# Patient Record
Sex: FEMALE | Race: WHITE | NOT HISPANIC OR LATINO | Employment: OTHER | URBAN - METROPOLITAN AREA
[De-identification: names, ages, dates, MRNs, and addresses within clinical notes are randomized per-mention and may not be internally consistent; named-entity substitution may affect disease eponyms.]

---

## 2017-06-14 ENCOUNTER — HOSPITAL ENCOUNTER (INPATIENT)
Facility: CLINIC | Age: 62
LOS: 3 days | Discharge: HOME | DRG: 194 | End: 2017-06-17
Attending: EMERGENCY MEDICINE | Admitting: INTERNAL MEDICINE
Payer: COMMERCIAL

## 2017-06-14 ENCOUNTER — APPOINTMENT (OUTPATIENT)
Dept: CT IMAGING | Facility: CLINIC | Age: 62
DRG: 194 | End: 2017-06-14
Attending: EMERGENCY MEDICINE
Payer: COMMERCIAL

## 2017-06-14 DIAGNOSIS — E87.6 HYPOKALEMIA: ICD-10-CM

## 2017-06-14 DIAGNOSIS — R91.8 LEFT PULMONARY INFILTRATE ON CXR: ICD-10-CM

## 2017-06-14 DIAGNOSIS — I24.9 ACS (ACUTE CORONARY SYNDROME) (H): ICD-10-CM

## 2017-06-14 DIAGNOSIS — R79.89 ELEVATED TROPONIN: Primary | ICD-10-CM

## 2017-06-14 LAB
ANION GAP SERPL CALCULATED.3IONS-SCNC: 8 MMOL/L (ref 3–14)
BASOPHILS # BLD AUTO: 0 10E9/L (ref 0–0.2)
BASOPHILS NFR BLD AUTO: 0.1 %
BUN SERPL-MCNC: 14 MG/DL (ref 7–30)
CALCIUM SERPL-MCNC: 8.8 MG/DL (ref 8.5–10.1)
CHLORIDE SERPL-SCNC: 101 MMOL/L (ref 94–109)
CO2 SERPL-SCNC: 21 MMOL/L (ref 20–32)
CREAT SERPL-MCNC: 0.73 MG/DL (ref 0.52–1.04)
DIFFERENTIAL METHOD BLD: ABNORMAL
EOSINOPHIL # BLD AUTO: 0 10E9/L (ref 0–0.7)
EOSINOPHIL NFR BLD AUTO: 0.6 %
ERYTHROCYTE [DISTWIDTH] IN BLOOD BY AUTOMATED COUNT: 13.6 % (ref 10–15)
GFR SERPL CREATININE-BSD FRML MDRD: 81 ML/MIN/1.7M2
GLUCOSE SERPL-MCNC: 111 MG/DL (ref 70–99)
HCT VFR BLD AUTO: 32.7 % (ref 35–47)
HGB BLD-MCNC: 10.8 G/DL (ref 11.7–15.7)
IMM GRANULOCYTES # BLD: 0 10E9/L (ref 0–0.4)
IMM GRANULOCYTES NFR BLD: 0.4 %
LYMPHOCYTES # BLD AUTO: 0.7 10E9/L (ref 0.8–5.3)
LYMPHOCYTES NFR BLD AUTO: 10.4 %
MCH RBC QN AUTO: 26.2 PG (ref 26.5–33)
MCHC RBC AUTO-ENTMCNC: 33 G/DL (ref 31.5–36.5)
MCV RBC AUTO: 79 FL (ref 78–100)
MONOCYTES # BLD AUTO: 0.7 10E9/L (ref 0–1.3)
MONOCYTES NFR BLD AUTO: 10.4 %
NEUTROPHILS # BLD AUTO: 5.2 10E9/L (ref 1.6–8.3)
NEUTROPHILS NFR BLD AUTO: 78.1 %
NRBC # BLD AUTO: 0 10*3/UL
NRBC BLD AUTO-RTO: 0 /100
NT-PROBNP SERPL-MCNC: 2010 PG/ML (ref 0–900)
PLATELET # BLD AUTO: 156 10E9/L (ref 150–450)
POTASSIUM SERPL-SCNC: 3 MMOL/L (ref 3.4–5.3)
POTASSIUM SERPL-SCNC: 3.3 MMOL/L (ref 3.4–5.3)
RBC # BLD AUTO: 4.12 10E12/L (ref 3.8–5.2)
SODIUM SERPL-SCNC: 130 MMOL/L (ref 133–144)
TROPONIN I BLD-MCNC: 0.33 UG/L (ref 0–0.1)
TROPONIN I SERPL-MCNC: 0.23 UG/L (ref 0–0.04)
TROPONIN I SERPL-MCNC: 0.3 UG/L (ref 0–0.04)
WBC # BLD AUTO: 6.7 10E9/L (ref 4–11)

## 2017-06-14 PROCEDURE — 84145 PROCALCITONIN (PCT): CPT | Performed by: EMERGENCY MEDICINE

## 2017-06-14 PROCEDURE — 80048 BASIC METABOLIC PNL TOTAL CA: CPT | Performed by: EMERGENCY MEDICINE

## 2017-06-14 PROCEDURE — 96365 THER/PROPH/DIAG IV INF INIT: CPT

## 2017-06-14 PROCEDURE — 84132 ASSAY OF SERUM POTASSIUM: CPT | Performed by: PHYSICIAN ASSISTANT

## 2017-06-14 PROCEDURE — 84484 ASSAY OF TROPONIN QUANT: CPT | Performed by: PHYSICIAN ASSISTANT

## 2017-06-14 PROCEDURE — 83880 ASSAY OF NATRIURETIC PEPTIDE: CPT | Performed by: EMERGENCY MEDICINE

## 2017-06-14 PROCEDURE — 36415 COLL VENOUS BLD VENIPUNCTURE: CPT | Performed by: PHYSICIAN ASSISTANT

## 2017-06-14 PROCEDURE — 25000128 H RX IP 250 OP 636: Performed by: PHYSICIAN ASSISTANT

## 2017-06-14 PROCEDURE — 84484 ASSAY OF TROPONIN QUANT: CPT

## 2017-06-14 PROCEDURE — 93005 ELECTROCARDIOGRAM TRACING: CPT

## 2017-06-14 PROCEDURE — 99223 1ST HOSP IP/OBS HIGH 75: CPT | Mod: AI | Performed by: INTERNAL MEDICINE

## 2017-06-14 PROCEDURE — 71260 CT THORAX DX C+: CPT

## 2017-06-14 PROCEDURE — 12000007 ZZH R&B INTERMEDIATE

## 2017-06-14 PROCEDURE — 85025 COMPLETE CBC W/AUTO DIFF WBC: CPT | Performed by: EMERGENCY MEDICINE

## 2017-06-14 PROCEDURE — 25000132 ZZH RX MED GY IP 250 OP 250 PS 637: Performed by: EMERGENCY MEDICINE

## 2017-06-14 PROCEDURE — 96376 TX/PRO/DX INJ SAME DRUG ADON: CPT

## 2017-06-14 PROCEDURE — 99285 EMERGENCY DEPT VISIT HI MDM: CPT | Mod: 25

## 2017-06-14 PROCEDURE — 25000128 H RX IP 250 OP 636: Performed by: EMERGENCY MEDICINE

## 2017-06-14 PROCEDURE — 93005 ELECTROCARDIOGRAM TRACING: CPT | Mod: 76

## 2017-06-14 PROCEDURE — 25000132 ZZH RX MED GY IP 250 OP 250 PS 637: Performed by: PHYSICIAN ASSISTANT

## 2017-06-14 PROCEDURE — 84484 ASSAY OF TROPONIN QUANT: CPT | Performed by: EMERGENCY MEDICINE

## 2017-06-14 RX ORDER — ATORVASTATIN CALCIUM 40 MG/1
40 TABLET, FILM COATED ORAL
Status: DISCONTINUED | OUTPATIENT
Start: 2017-06-14 | End: 2017-06-15

## 2017-06-14 RX ORDER — ACETAMINOPHEN 650 MG/1
650 SUPPOSITORY RECTAL EVERY 4 HOURS PRN
Status: DISCONTINUED | OUTPATIENT
Start: 2017-06-14 | End: 2017-06-17 | Stop reason: HOSPADM

## 2017-06-14 RX ORDER — POTASSIUM CHLORIDE 1500 MG/1
20-40 TABLET, EXTENDED RELEASE ORAL
Status: DISCONTINUED | OUTPATIENT
Start: 2017-06-14 | End: 2017-06-17 | Stop reason: HOSPADM

## 2017-06-14 RX ORDER — NALOXONE HYDROCHLORIDE 0.4 MG/ML
.1-.4 INJECTION, SOLUTION INTRAMUSCULAR; INTRAVENOUS; SUBCUTANEOUS
Status: DISCONTINUED | OUTPATIENT
Start: 2017-06-14 | End: 2017-06-17 | Stop reason: HOSPADM

## 2017-06-14 RX ORDER — ACETAMINOPHEN 325 MG/1
650 TABLET ORAL EVERY 4 HOURS PRN
Status: DISCONTINUED | OUTPATIENT
Start: 2017-06-14 | End: 2017-06-14

## 2017-06-14 RX ORDER — IPRATROPIUM BROMIDE AND ALBUTEROL SULFATE 2.5; .5 MG/3ML; MG/3ML
3 SOLUTION RESPIRATORY (INHALATION) 4 TIMES DAILY PRN
Status: DISCONTINUED | OUTPATIENT
Start: 2017-06-14 | End: 2017-06-17 | Stop reason: HOSPADM

## 2017-06-14 RX ORDER — LIDOCAINE 40 MG/G
CREAM TOPICAL
Status: DISCONTINUED | OUTPATIENT
Start: 2017-06-14 | End: 2017-06-14

## 2017-06-14 RX ORDER — ASPIRIN 325 MG
325 TABLET ORAL DAILY
Status: DISCONTINUED | OUTPATIENT
Start: 2017-06-15 | End: 2017-06-17 | Stop reason: HOSPADM

## 2017-06-14 RX ORDER — FLUTICASONE PROPIONATE 50 MCG
2 SPRAY, SUSPENSION (ML) NASAL DAILY
Status: ON HOLD | COMMUNITY
End: 2022-10-03

## 2017-06-14 RX ORDER — ALUMINA, MAGNESIA, AND SIMETHICONE 2400; 2400; 240 MG/30ML; MG/30ML; MG/30ML
15-30 SUSPENSION ORAL EVERY 4 HOURS PRN
Status: DISCONTINUED | OUTPATIENT
Start: 2017-06-14 | End: 2017-06-17 | Stop reason: HOSPADM

## 2017-06-14 RX ORDER — AMPICILLIN AND SULBACTAM 2; 1 G/1; G/1
3 INJECTION, POWDER, FOR SOLUTION INTRAMUSCULAR; INTRAVENOUS EVERY 6 HOURS
Status: DISCONTINUED | OUTPATIENT
Start: 2017-06-14 | End: 2017-06-17

## 2017-06-14 RX ORDER — POTASSIUM CHLORIDE 7.45 MG/ML
10 INJECTION INTRAVENOUS
Status: DISCONTINUED | OUTPATIENT
Start: 2017-06-14 | End: 2017-06-17 | Stop reason: HOSPADM

## 2017-06-14 RX ORDER — ASPIRIN 81 MG/1
324 TABLET, CHEWABLE ORAL ONCE
Status: COMPLETED | OUTPATIENT
Start: 2017-06-14 | End: 2017-06-14

## 2017-06-14 RX ORDER — LIDOCAINE 40 MG/G
CREAM TOPICAL
Status: DISCONTINUED | OUTPATIENT
Start: 2017-06-14 | End: 2017-06-17 | Stop reason: HOSPADM

## 2017-06-14 RX ORDER — POTASSIUM CHLORIDE 1.5 G/1.58G
20-40 POWDER, FOR SOLUTION ORAL
Status: DISCONTINUED | OUTPATIENT
Start: 2017-06-14 | End: 2017-06-17 | Stop reason: HOSPADM

## 2017-06-14 RX ORDER — PROCHLORPERAZINE 25 MG
25 SUPPOSITORY, RECTAL RECTAL EVERY 12 HOURS PRN
Status: DISCONTINUED | OUTPATIENT
Start: 2017-06-14 | End: 2017-06-17 | Stop reason: HOSPADM

## 2017-06-14 RX ORDER — SODIUM CHLORIDE 9 MG/ML
INJECTION, SOLUTION INTRAVENOUS CONTINUOUS
Status: ACTIVE | OUTPATIENT
Start: 2017-06-14 | End: 2017-06-15

## 2017-06-14 RX ORDER — POTASSIUM CHLORIDE 1500 MG/1
40 TABLET, EXTENDED RELEASE ORAL ONCE
Status: COMPLETED | OUTPATIENT
Start: 2017-06-14 | End: 2017-06-14

## 2017-06-14 RX ORDER — LOSARTAN POTASSIUM 25 MG/1
25 TABLET ORAL EVERY MORNING
Status: DISCONTINUED | OUTPATIENT
Start: 2017-06-15 | End: 2017-06-17 | Stop reason: HOSPADM

## 2017-06-14 RX ORDER — ONDANSETRON 4 MG/1
4 TABLET, ORALLY DISINTEGRATING ORAL EVERY 6 HOURS PRN
Status: DISCONTINUED | OUTPATIENT
Start: 2017-06-14 | End: 2017-06-17 | Stop reason: HOSPADM

## 2017-06-14 RX ORDER — ALPRAZOLAM 0.5 MG/1
0.5 TABLET, EXTENDED RELEASE ORAL 3 TIMES DAILY PRN
Status: DISCONTINUED | OUTPATIENT
Start: 2017-06-14 | End: 2017-06-17 | Stop reason: HOSPADM

## 2017-06-14 RX ORDER — DIPHENHYDRAMINE HCL 25 MG
50 CAPSULE ORAL EVERY 6 HOURS PRN
Status: DISCONTINUED | OUTPATIENT
Start: 2017-06-14 | End: 2017-06-17 | Stop reason: HOSPADM

## 2017-06-14 RX ORDER — ACETAMINOPHEN 325 MG/1
650 TABLET ORAL EVERY 4 HOURS PRN
Status: DISCONTINUED | OUTPATIENT
Start: 2017-06-14 | End: 2017-06-17 | Stop reason: HOSPADM

## 2017-06-14 RX ORDER — PROCHLORPERAZINE MALEATE 5 MG
5-10 TABLET ORAL EVERY 6 HOURS PRN
Status: DISCONTINUED | OUTPATIENT
Start: 2017-06-14 | End: 2017-06-17 | Stop reason: HOSPADM

## 2017-06-14 RX ORDER — POTASSIUM CHLORIDE 29.8 MG/ML
20 INJECTION INTRAVENOUS
Status: DISCONTINUED | OUTPATIENT
Start: 2017-06-14 | End: 2017-06-17 | Stop reason: HOSPADM

## 2017-06-14 RX ORDER — CEFTRIAXONE 2 G/1
2 INJECTION, POWDER, FOR SOLUTION INTRAMUSCULAR; INTRAVENOUS EVERY 24 HOURS
Status: DISCONTINUED | OUTPATIENT
Start: 2017-06-14 | End: 2017-06-14

## 2017-06-14 RX ORDER — MULTIPLE VITAMINS W/ MINERALS TAB 9MG-400MCG
1 TAB ORAL DAILY
COMMUNITY

## 2017-06-14 RX ORDER — NITROGLYCERIN 0.4 MG/1
0.4 TABLET SUBLINGUAL EVERY 5 MIN PRN
Status: DISCONTINUED | OUTPATIENT
Start: 2017-06-14 | End: 2017-06-14

## 2017-06-14 RX ORDER — IOPAMIDOL 755 MG/ML
500 INJECTION, SOLUTION INTRAVASCULAR ONCE
Status: COMPLETED | OUTPATIENT
Start: 2017-06-14 | End: 2017-06-14

## 2017-06-14 RX ORDER — OMEGA-3 FATTY ACIDS/FISH OIL 300-1000MG
1000 CAPSULE ORAL EVERY MORNING
Status: ON HOLD | COMMUNITY
End: 2022-10-03

## 2017-06-14 RX ORDER — AZITHROMYCIN 250 MG/1
250 TABLET, FILM COATED ORAL EVERY 24 HOURS
Status: DISCONTINUED | OUTPATIENT
Start: 2017-06-15 | End: 2017-06-17 | Stop reason: HOSPADM

## 2017-06-14 RX ORDER — POTASSIUM CL/LIDO/0.9 % NACL 10MEQ/0.1L
10 INTRAVENOUS SOLUTION, PIGGYBACK (ML) INTRAVENOUS
Status: DISCONTINUED | OUTPATIENT
Start: 2017-06-14 | End: 2017-06-17 | Stop reason: HOSPADM

## 2017-06-14 RX ORDER — SERTRALINE HYDROCHLORIDE 100 MG/1
150 TABLET, FILM COATED ORAL EVERY MORNING
COMMUNITY

## 2017-06-14 RX ORDER — BISACODYL 10 MG
10 SUPPOSITORY, RECTAL RECTAL DAILY PRN
Status: DISCONTINUED | OUTPATIENT
Start: 2017-06-14 | End: 2017-06-17 | Stop reason: HOSPADM

## 2017-06-14 RX ORDER — ONDANSETRON 2 MG/ML
4 INJECTION INTRAMUSCULAR; INTRAVENOUS EVERY 6 HOURS PRN
Status: DISCONTINUED | OUTPATIENT
Start: 2017-06-14 | End: 2017-06-17 | Stop reason: HOSPADM

## 2017-06-14 RX ADMIN — SODIUM CHLORIDE 80 ML: 9 INJECTION, SOLUTION INTRAVENOUS at 18:21

## 2017-06-14 RX ADMIN — ASPIRIN 81 MG 324 MG: 81 TABLET ORAL at 18:05

## 2017-06-14 RX ADMIN — HEPARIN SODIUM 12 UNITS/KG/HR: 10000 INJECTION, SOLUTION INTRAVENOUS at 19:47

## 2017-06-14 RX ADMIN — POTASSIUM CHLORIDE 40 MEQ: 1500 TABLET, EXTENDED RELEASE ORAL at 18:52

## 2017-06-14 RX ADMIN — SODIUM CHLORIDE: 9 INJECTION, SOLUTION INTRAVENOUS at 22:14

## 2017-06-14 RX ADMIN — IOPAMIDOL 64 ML: 755 INJECTION, SOLUTION INTRAVENOUS at 18:21

## 2017-06-14 RX ADMIN — AZITHROMYCIN MONOHYDRATE 500 MG: 500 INJECTION, POWDER, LYOPHILIZED, FOR SOLUTION INTRAVENOUS at 22:16

## 2017-06-14 RX ADMIN — NITROGLYCERIN 0.4 MG: 0.4 TABLET SUBLINGUAL at 19:42

## 2017-06-14 RX ADMIN — METOPROLOL TARTRATE 12.5 MG: 25 TABLET, FILM COATED ORAL at 22:28

## 2017-06-14 RX ADMIN — Medication 4000 UNITS: at 19:47

## 2017-06-14 RX ADMIN — ACETAMINOPHEN 650 MG: 325 TABLET, FILM COATED ORAL at 22:27

## 2017-06-14 ASSESSMENT — ENCOUNTER SYMPTOMS
FATIGUE: 1
NAUSEA: 0
BACK PAIN: 0
VOMITING: 0
FEVER: 1
SHORTNESS OF BREATH: 1
COUGH: 1
CHILLS: 1

## 2017-06-14 NOTE — IP AVS SNAPSHOT
[place] [address] [facility]    [address]    [facility] [address]    Phone:  [phone]    Fax:  [phone]                                       After Visit Summary   6/14/2017    Zulema Carrillo    MRN: [id number]           After Visit Summary Signature Page     I have received my discharge instructions, and my questions have been answered. I have discussed any challenges I see with this plan with the nurse or doctor.    ..........................................................................................................................................  Patient/Patient Representative Signature      ..........................................................................................................................................  Patient Representative Print Name and Relationship to Patient    ..................................................               ................................................  Date                                            Time    ..........................................................................................................................................  Reviewed by Signature/Title    ...................................................              ..............................................  Date                                                            Time

## 2017-06-14 NOTE — ED PROVIDER NOTES
History     Chief Complaint:  Chest Pain    HPI:  Zulema Carrillo is a 61 year old  female with a history of hyperlipidemia, mitral valve disorder, amongst others, who presents with chest pain. The patient reports that she has been experiencing increasing fatigue, slight cough, chills, and subjective fever for the past few days. More concerning to her is her intermittent, achy, chest pain lasting for less than five minutes at a time with exertional shortness of breath, both of which are atypical for her. She also reports more shortness of breath than typical with exertion over the past month. Today, she first tried to see her primary doctor who was busy this afternoon and referred her to urgent care. At [facility] urgent care, she had laboratory work and an X-Ray performed. In addition, she received 650 mg Tylenol around 1530. Her chest X-Ray revealed a pneumonia in the left lung base. After she returned home, she received a call from her urgent care physician that she needed to visit the ED for an abnormal laboratory value. Regarding her cardiac history, she is normally healthy and last had a normal cardiac stress test two years ago. She endorses recent sick contacts as she is a , but otherwise denies calf pain or swelling, recent travel, nausea, vomiting, or back pain.     Chest X-Ray performed in Clinic 6/14/2017:  FINDINGS:  Patchy consolidation in the left lower lobe favors pneumonia. Lungs otherwise clear. Heart size normal.  As read by Radiology.     Laboratory Results from Clinic 6/14/2017:  D-Dimer: 0.74 (H)   Troponin I: 0.47 (H)     CARDIAC RISK FACTORS:  Sex:    Female  Tobacco:   No  Hypertension:   Yes  Hyperlipidemia:  Yes  Diabetes:   No  Family History:  Yes    PE/DVT RISK FACTORS:  Sex:    Female  Hormones:   No  Tobacco:   No  Cancer:   No  Travel:   No  Surgery:   No  Other immobilization: No  Personal history:  No  Family history:  No  "    Allergies:  Cephalexin  Ciprofloxacin  Glucosamine  Hydromorphone  Ibuprofen  Iodine  Lisinopril  Morphine      Medications:    Azithromycin  Simvastatin  Zoloft  Losartan  Flonase  Xanax  Fish oil  Cholecalciferol  Multivitamin  Imitrex     Past Medical History:    Hyperlipidemia  Depression  Migraines  Mitral valve disorder  Osteoarthritis  Sleep apnea  Hypertension  Depression    Past Surgical History:    Hysterectomy  Salpingo-oophorectomy  Mastectomy  Menisectomy  Breast surgery    Family History:    Cancer- Father, Sister x2  Hypertension- Father, Sister  Heart Surgery- Mother  Hyperlipidemia- Mother, Sister    Social History:  Smoking status: Never Smoker  Alcohol use: No   Marital Status:     Presents with  at bedside.      Review of Systems   Constitutional: Positive for chills, fatigue and fever.   Respiratory: Positive for cough and shortness of breath.    Cardiovascular: Positive for chest pain.   Gastrointestinal: Negative for nausea and vomiting.   Musculoskeletal: Negative for back pain.   All other systems reviewed and are negative.      Physical Exam     Patient Vitals for the past 24 hrs:   BP Temp Temp src Heart Rate Resp SpO2 Height Weight   06/14/17 1945 126/80 - - 82 - 97 % - -   06/14/17 1930 129/79 - - 81 - 97 % - -   06/14/17 1915 (!) 122/91 - - 82 - 97 % - -   06/14/17 1912 - - - - - - 1.702 m (5' 7\") 76.2 kg (168 lb)   06/14/17 1900 (!) 124/94 - - 81 - - - -   06/14/17 1800 129/80 - - 83 - 96 % - -   06/14/17 1745 119/84 - - 85 - 97 % - -   06/14/17 1730 119/78 - - 83 - 96 % - -   06/14/17 1715 122/78 - - 84 - 96 % - -   06/14/17 1700 133/87 - - - - 97 % - -   06/14/17 1657 133/86 98.5  F (36.9  C) Oral 87 16 98 % - -      Physical Exam:  General: Adult female, sitting upright  Eyes: PERRL, Conjunctive within normal limits  ENT: Moist mucous membranes, oropharynx clear.   Neck: No palpable thyroidmegaly   CV: Normal S1S2, no murmur, rub or gallop. Regular rate and rhythm. " No calf asymmetry or tenderness to palpation.   Resp: Crackle in left lung base. No wheezes, rales or rhonchi. Normal respiratory effort.  GI: Abdomen is soft, nontender and nondistended. No palpable masses. No rebound or guarding.  MSK: No edema. Nontender. Normal active range of motion.  Skin: Warm and dry. No rashes or lesions or ecchymoses on visible skin.  Neuro: Alert and oriented. Responds appropriately to all questions and commands. No focal findings appreciated. Normal muscle tone.  Psych: Normal mood and affect. Pleasant.    Emergency Department Course     ECG (16:59:48):  Rate 85 bpm. MN interval 164. QRS duration 84. QT/QTc 338/402. P-R-T axes 48-51-55. Normal sinus rhythm. Nonspecific ST and T wave abnormality. Abnormal ECG. Interpreted at 1700 by Shanice Avila MD.     ECG (18:44:17):  Rate 85 bpm. MN interval 166. QRS duration 84. QT/QTc 350/416. P-R-T axes 52-52-53. Normal sinus rhythm. Cannot rule out anterior infarct, age undetermined. Abnormal ECG. Interpreted at 1846 by Shanice Avila MD.     Imaging:  Radiographic findings were communicated with the patient and family who voiced understanding of the findings.    Chest CT, IV Contrast Only:  IMPRESSION:  1. No CT evidence of pulmonary embolism.  2. Left lower lobe alveolar opacity. While nonspecific, this is  suspicious for pneumonia.  As read by Radiology.    Laboratory:  BNP: 2010 (H)   Troponin I: 0.302 (H)   Basic Metabolic Panel: Sodium 130 (L), Potassium 3.0 (L), Glucose 111 (H), o/w WNL (Creatinine 0.73)   CBC: HGB 10.8 (L), HCT 32.7 (L), MCH 26.2 (L), Absolute Lymphocytes 0.7 (L), o/w WNL (WBC 6.7, )    Troponin POCT: 0.33 (H)     Interventions:  1805- Aspirin 324 mg PO  1852- Potassium Chloride 40 mEq PO  1942- Nitroglycerin 0.4 mg Sublingual  1947- Heparin Loading Dose 4,000 units IV  1947- Heparin Infusion 25,000 units (12 units/kg/hr) IV    Emergency Department Course:  An ECG was obtained.     Past medical  records, nursing notes, and vitals reviewed.  1732: I performed an exam of the patient and obtained history, as documented above.     IV inserted and blood drawn.     1755: I rechecked the patient. Explained plan to the patient.     The above interventions were administered. Patient denies any chest pain currently.    The patient was sent for a chest CT while in the emergency department, findings above.     Another ECG was obtained.    1933: I discussed the case with Dr. Gurrola of cardiology regarding the patient.     Findings and plan explained to the Patient and spouse who consents to admission. Patient notes slight pain under left breast.     Patient reassessed,  denies any current pain after single sublingual nitroglycerin.    2000: Discussed the patient with Lyndsey Lentz PA-C, who will admit the patient to a telemetry bed under the care of Dr. Win for further monitoring, evaluation, and treatment.      Impression & Plan      Medical Decision Making:  Zulema Carrillo is a 61 year old female with a history of hyperlipidemia and mitral valve disorder who presents to the emergency department for intermittent chest pain, exertional dyspnea. She is somewhat of a poor historian, but it sounds like her exertional dyspnea has been ongoing for the past month, whereas her chest pain has only lasted for a couple days. Her chest pain lasts less than 5 minutes at a time. Here, she has been experiencing intermittent chest pain, relieved with Nitroglycerin. She arrived with an elevated troponin of 0.47, but there were no indications of ST elevations or ischemia on her ECG. Repeat troponin was not further elevated (slightly less). There were possible Q waves anteriorly, suggesting possible subacute process. I was concerned given her symptoms for acute coronary syndrome. She was administered Heparin. No bleeding problems noted on discussion of heparin use with the patient. She did have a noted left lower lobe  infiltrate, thought to be a pneumonia. However, she denies fever, leukocytosis, or significant cough. Given her lack of symptoms, antibiotics were not given emergently. I initially considered PE given her outpatient elevated d-dimer, but this was not seen on her chest CT, nor was other acute pathology aside from the infiltrate of unclear cause. She will be admitted for further care. Dr. Gurrola of cardiology is aware of the patient's admission. The patient's questions were answered. She is agreeable with the plan for admission and is in stable condition.     Diagnosis:    ICD-10-CM   1. ACS (acute coronary syndrome) (H) I24.9   2. Hypokalemia E87.6   3. Left pulmonary infiltrate on CXR R91.8     Disposition:  Admitted to telemetry under the care of Dr. Win and Lyndsey Lentz PA-C.     Paz Callahan  6/14/2017   [facility] EMERGENCY DEPARTMENT    I, Paz Callahan, am serving as a scribe at 5:32 PM on 6/14/2017 to document services personally performed by Shanice Avila MD based on my observations and the provider's statements to me.       Shanice Avila MD  06/14/17 7211

## 2017-06-14 NOTE — IP AVS SNAPSHOT
MRN:[id number]                      After Visit Summary   6/14/2017    Zulema Carrillo    MRN: [id number]           Thank you!     Thank you for choosing [facility] for your care. Our goal is always to provide you with excellent care. Hearing back from our patients is one way we can continue to improve our services. Please take a few minutes to complete the written survey that you may receive in the mail after you visit. If you would like to speak to someone directly about your visit please contact Patient Relations at [phone]. Thank you!          Patient Information     Date Of Birth          1955        Designated Caregiver       Most Recent Value    Caregiver    Will someone help with your care after discharge? yes    Name of designated caregiver Johnny ()    Phone number of caregiver [phone]    Caregiver address [address] [place] [address]      About your hospital stay     You were admitted on:  June 14, 2017 You last received care in the:  [place] [address] [facility]    You were discharged on:  June 17, 2017        Reason for your hospital stay       Pneumonia, elevated Trop                  Who to Call     For medical emergencies, please call 911.  For non-urgent questions about your medical care, please call your primary care provider or clinic, [phone]          Attending Provider     Provider Specialty    Shanice Avila MD Emergency Medicine    [facility], Justin Vences MD Internal Medicine       Primary Care Provider Office Phone # Fax #    Ana Paula Jett [phone]      After Care Instructions     Activity       Your activity upon discharge: activity as tolerated            Diet       Follow this diet upon discharge: Orders Placed This Encounter      Regular Diet Adult No Caffeine for 24 hours (once tests completed, may have caffeine), Decaf beverages                  Follow-up Appointments      "Follow-up and recommended labs and tests        Follow up with primary care provider, GERSON CHARLES, within 7 days for hospital follow- up.    Follow up Cardiologist- out patient stress test in 1 week.                  Additional Services     Follow-Up with Cardiac Advanced Practice Provider                 Future tests that were ordered for you     NM Exercise stress test (nuc card)       The type of stress to be performed (pharmacologic or physical) will be at the discretion of the supervising physician as per department protocol.                  Pending Results     No orders found from 2017 to 6/15/2017.            Statement of Approval     Ordered          17 1319  I have reviewed and agree with all the recommendations and orders detailed in this document.  EFFECTIVE NOW     Approved and electronically signed by:  Adin Davenport MD             Admission Information     Date & Time Provider Department Dept. Phone    2017 Justin Sweet MD [place] [address] [facility] [phone]      Your Vitals Were     Blood Pressure Temperature Respirations Height Weight Pulse Oximetry    135/84 (BP Location: Left arm) 98.1  F (36.7  C) (Oral) 16 1.702 m (5' 7\") 77.7 kg (171 lb 6.4 oz) 96%    BMI (Body Mass Index)                   26.85 kg/m2           [email or web address] Information     [employer] lets you send messages to your doctor, view your test results, renew your prescriptions, schedule appointments and more. To sign up, go to [email or web address] . Click on \"Log in\" on the left side of the screen, which will take you to the Welcome page. Then click on \"Sign up Now\" on the right side of the page.     You will be asked to enter the access code listed below, as well as some personal information. Please follow the directions to create your username and password.     Your access code is: [email or web address]  Expires: 9/15/2017  1:55 PM     Your access code will  in 90 days. If " you need help or a new code, please call your [place] clinic or [phone].        Care EveryWhere ID     This is your Care EveryWhere ID. This could be used by other organizations to access your [place] medical records  [id number]           Review of your medicines      START taking        Dose / Directions    aspirin 81 MG EC tablet   Used for:  Elevated troponin        Dose:  81 mg   Take 1 tablet (81 mg) by mouth daily   Quantity:  30 tablet   Refills:  0         CONTINUE these medicines which may have CHANGED, or have new prescriptions. If we are uncertain of the size of tablets/capsules you have at home, strength may be listed as something that might have changed.        Dose / Directions    azithromycin 250 MG tablet   Commonly known as:  ZITHROMAX   Indication:  Community Acquired Pneumonia   This may have changed:    - how much to take  - when to take this  - additional instructions   Used for:  Left pulmonary infiltrate on CXR        Dose:  250 mg   Take 1 tablet (250 mg) by mouth every 24 hours for 3 days   Quantity:  3 tablet   Refills:  0         CONTINUE these medicines which have NOT CHANGED        Dose / Directions    COZAAR PO        Dose:  25 mg   Take 25 mg by mouth every morning   Refills:  0       fluticasone 50 MCG/ACT spray   Commonly known as:  FLONASE        Dose:  2 spray   Spray 2 sprays into both nostrils daily   Refills:  0       multivitamin, therapeutic with minerals Tabs tablet        Dose:  1 tablet   Take 1 tablet by mouth daily   Refills:  0       omega 3 1000 MG Caps        Dose:  1000 mg   Take 1,000 mg by mouth every morning   Refills:  0       sertraline 100 MG tablet   Commonly known as:  ZOLOFT        Dose:  150 mg   Take 150 mg by mouth every morning   Refills:  0       VITAMIN D (CHOLECALCIFEROL) PO        Dose:  2000 Units   Take 2,000 Units by mouth every morning   Refills:  0       XANAX PO        Dose:  0.5 mg   Take 0.5 mg by mouth 3 times daily as needed for  anxiety   Refills:  0       ZOCOR PO        Dose:  20 mg   Take 20 mg by mouth every evening Patient has corn allergy, needs corn free medication. Patient uses Aurob .   Refills:  0            Where to get your medicines      These medications were sent to [place], [place] - [address] [address] [facility]  [address] [facility] [address]     Phone:  [phone]     aspirin 81 MG EC tablet    azithromycin 250 MG tablet                Protect others around you: Learn how to safely use, store and throw away your medicines at www.disposemymeds.org.             Medication List: This is a list of all your medications and when to take them. Check marks below indicate your daily home schedule. Keep this list as a reference.      Medications           Morning Afternoon Evening Bedtime As Needed    aspirin 81 MG EC tablet   Take 1 tablet (81 mg) by mouth daily   Next Dose Due:  Take today                                azithromycin 250 MG tablet   Commonly known as:  ZITHROMAX   Take 1 tablet (250 mg) by mouth every 24 hours for 3 days   Last time this was given:  250 mg on 6/17/2017 10:09 AM   Next Dose Due:  Tomorrow morning at 10:00 am. 6/18/17                                COZAAR PO   Take 25 mg by mouth every morning   Last time this was given:  25 mg on 6/17/2017 10:05 AM   Next Dose Due:  Tomorrow morning. 6/18/17.                                fluticasone 50 MCG/ACT spray   Commonly known as:  FLONASE   Spray 2 sprays into both nostrils daily   Next Dose Due:  Take as scheduled at home                                multivitamin, therapeutic with minerals Tabs tablet   Take 1 tablet by mouth daily   Next Dose Due:  Take as scheduled at home                                omega 3 1000 MG Caps   Take 1,000 mg by mouth every morning   Next Dose Due:  Take as scheduled at home                                sertraline 100 MG tablet   Commonly known as:  ZOLOFT   Take 150 mg  by mouth every morning   Last time this was given:  150 mg on 6/17/2017 10:00 AM   Next Dose Due:  Tomorrow morning. 6/18/17.                                VITAMIN D (CHOLECALCIFEROL) PO   Take 2,000 Units by mouth every morning   Next Dose Due:  Take as scheduled at home                                XANAX PO   Take 0.5 mg by mouth 3 times daily as needed for anxiety   Last time this was given:  0.5 mg on 6/15/2017  8:43 PM   Next Dose Due:  As needed                                ZOCOR PO   Take 20 mg by mouth every evening Patient has corn allergy, needs corn free medication. Patient uses Aurob .   Last time this was given:  20 mg on 6/16/2017  9:12 PM   Next Dose Due:  Take tonight 6/17/17                                          More Information        Recognizing a Heart Attack or Angina  If you have risk factors for heart problems, you should always watch for signs of angina or a heart attack. If you have a sudden heart problem, getting treatment right away could save your life.   Risk factors for a heart attack include advanced age, high cholesterol, high blood pressure, having a family member who had a heart attack before the age of 50, diabetes, smoking, and stress. There are other risk factors including eating a high-fat diet and getting minimal exercise.  Understanding angina and heart attack    Angina is a painful burning, tightness, or pressure in the chest, back, neck, throat, or jaw. It means not enough blood is getting to the heart. This is usually from a blocked artery in the heart. Angina is a sign that you may be having, or are about to have, a heart attack. It needs to be addressed by a healthcare provider right away.    A heart attack, also known as acute myocardial infarction, or AMI, is what happens when blood can't get to part of the heart muscle. That part of the heart muscle is damaged and starts to die. If enough of the heart is affected, it will severely reduce its ability  to provide blood to the rest of the body. It may cause death. It is vital to get help as soon as possible for a heart attack.  Stable angina versus unstable angina  Stable angina, also known as chronic angina, has a typical pattern. It occurs predictably with physical exertion or strong emotion. Nitroglycerin, rest, or both, will easily relieve stable angina symptoms. Stable angina symptoms will most likely feel the same each time you have them. It is important to discuss these symptoms with your healthcare provider. They can be a warning sign for a future heart attack.  Unstable angina causes unexpected or unpredictable symptoms, commonly occurring at rest, and is a medical emergency. Angina is also considered unstable if resting and nitroglycerin doesn't relieve symptoms, It's also unstable if symptoms are worsening, occurring more often, or are lasting longer. These symptoms suggest a severe blockage or a spasm of a heart artery. Unstable angina is commonly a sign of an active heart attack. Remember the following tips:    Stable angina symptoms should go away with rest or medicine. If they don t go away, call 911!    Stable angina symptoms last for only a few minutes. If they last longer than that, or if they go away and come back, you may be having a heart attack. Call 911!    If you have shortness of breath, cold sweat, nausea, or lightheadedness, call 911!  For new-onset angina, there is only one response: ?call 911! You should never diagnose angina by yourself. If these symptoms are new, or worse than usual, call 911!  Warning signs of a heart attack  If you have symptoms that you can t explain, call 911 right away. Do not drive yourself to the emergency room. The following are warning signs of a possible heart attack:    Chest discomfort. Most heart attacks involve discomfort in the center of the chest that lasts more than a few minutes, or that goes away and comes back. It can feel like uncomfortable  pressure, squeezing, fullness or pain.    Discomfort in other areas of the upper body. Symptoms can include pain or discomfort in one or both arms, the back, neck, jaw, or stomach.    Shortness of breath with or without chest discomfort.    Other signs may include breaking out in a cold sweat, nausea, or lightheadedness.  Note for women: Like men, women commonly have chest pain or discomfort as a heart attack symptom. But women are somewhat more likely than men to have other common symptoms, such as shortness of breath, nausea and vomiting, back pain, or jaw pain.  Note for older adults: Older people may also have atypical symptoms of a heart attack. These symptoms include fainting, weakness, or confusion. Ignoring these symptoms can lead to critical illness or death. They should be investigated right away.  If you've had a heart attack: People who have had one heart attack are at risk for having another. Your provider may prescribe medicine such as nitroglycerin to take when chest pain starts. Or you may need medicines to lower your heart rate and blood pressure to prevent angina and another heart attack. Remember to take any medicines your provider has given and do not stop them without speaking with him or her first.     If you have diabetes: silent heart problems  Over time, high blood sugar can damage nerves in your body. This may keep you from feeling pain caused by a heart problem, leading to a  silent  heart problem. If you don t feel symptoms, you are less able to recognize that you may be having a heart attack and get treatment right away. Talk to your healthcare provider about how to lower your risk for silent heart problems.   Date Last Reviewed: 6/1/2016 2000-2017 [employer]. [address] [place] [address], [place] [address]. All rights reserved. This information is not intended as a substitute for professional medical care. Always follow your healthcare professional's  instructions.                Symptoms of a Heart Attack       A heart attack is also known as acute myocardial infarction, or AMI. It's an urgent message from your heart that it s starved for oxygen. When a clot blocks a blood vessel feeding the heart (coronary artery), oxygen-rich blood can t reach a part or all of your heart. Then tissues of the heart muscle start to die. This causes symptoms of a heart attack. The sooner you get to the hospital; the sooner treatment can start to help save your life and your heart.  Don t be afraid to call 911, even if you re not sure you are having a heart attack. If you don t know the cause of your symptoms, assume it s a heart attack. Play it safe and get medical help. Do not drive yourself to the emergency department.   Warning signs of a heart attack    Chest discomfort. Most heart attacks involve discomfort in the center of the chest that lasts more than a few minutes, or that goes away and comes back. It can feel like uncomfortable pressure, squeezing, burning, fullness, tightness, or pain. It is often described as something heavy sitting on your chest.    Discomfort in other areas of the upper body. Symptoms can include pain or discomfort in one or both arms, the back, neck, jaw or stomach.    Shortness of breath with or without chest discomfort.    Other signs may include breaking out in a cold sweat, nausea, or lightheadedness.  Note for women: Like men, women most commonly have chest pain or discomfort as a heart attack symptom. But women are somewhat more likely than men to have some of the other common symptoms, particularly shortness of breath, nausea, and vomiting, back pain, or jaw pain.  Older people may also have atypical symptoms. The symptoms include loss of consciousness (syncope), weakness, or confusion (delirium). These symptoms should be evaluated immediately. Ignoring them can lead to critical illness or death.  If you have diabetes, high blood sugar can  damage nerves in your body over time. This may keep you from feeling pain caused by a heart problem, leading to a  silent  heart problem. If you don t feel symptoms, you are less able to get treatment right away. Talk to your healthcare provider about how to lower your risk for silent heart problems.  People who have had one heart attack are at risk for having another heart attack. Your provider may prescribe medicine such as nitroglycerin to take for chest pain. You may also need medicine to lower your heart rate and blood pressure to prevent angina and another heart attack. Remember to take any medicines your provider has given as directed. Do not stop these medicines without speaking with him or her first.  Date Last Reviewed: 6/1/2016 2000-2017 [employer]. [address] [place] [address], [place] [address]. All rights reserved. This information is not intended as a substitute for professional medical care. Always follow your healthcare professional's instructions.                Pneumonia (Adult)  Pneumonia is an infection deep within the lungs. It is in the small air sacs (alveoli). Pneumonia may be caused by a virus or bacteria. Pneumonia caused by bacteria is usually treated with an antibiotic. Severe cases may need to be treated in the hospital. Milder cases can be treated at home. Symptoms usually start to get better during the first 2 days of treatment.    Home care  Follow these guidelines when caring for yourself at home:    Rest at home for the first 2 to 3 days, or until you feel stronger. Don t let yourself get overly tired when you go back to your activities.    Stay away from cigarette smoke - yours or other people s.    You may use acetaminophen or ibuprofen to control fever or pain, unless another medicine was prescribed. If you have chronic liver or kidney disease, talk with your healthcare provider before using these medicines. Also talk with your provider if you ve had a stomach ulcer  or gastrointestinal bleeding. Don t give aspirin to anyone younger than 18 years of age who is ill with a fever. It may cause severe liver damage.    Your appetite may be poor, so a light diet is fine.    Drink 6 to 8 glasses of fluids every day to make sure you are getting enough fluids. Beverages can include water, sport drinks, sodas without caffeine, juices, tea, or soup. Fluids will help loosen secretions in the lung. This will make it easier for you to cough up the phlegm (sputum). If you also have heart or kidney disease, check with your healthcare provider before you drink extra fluids.    Take antibiotic medicine prescribed until it is all gone, even if you are feeling better after a few days.  Follow-up care  Follow up with your healthcare provider in the next 2 to 3 days, or as advised. This is to be sure the medicine is helping you get better.  If you are 65 or older, you should get a pneumococcal vaccine and a yearly flu (influenza) shot. You should also get these vaccines if you have chronic lung disease like asthma, emphysema, or COPD. Recently, a second type of pneumonia vaccine has become available for everyone over 65 years old. This is in addition to the previous vaccine. Ask your provider about this.  When to seek medical advice  Call your healthcare provider right away if any of these occur:    You don t get better within the first 48 hours of treatment    Shortness of breath gets worse    Rapid breathing (more than 25 breaths per minute)    Coughing up blood    Chest pain gets worse with breathing    Fever of 100.4 F (38 C) or higher that doesn t get better with fever medicine    Weakness, dizziness, or fainting that gets worse    Thirst or dry mouth that gets worse    Sinus pain, headache, or a stiff neck    Chest pain not caused by coughing  Date Last Reviewed: 1/1/2017 2000-2017 [employer]. [address] [place] [address], [place] [address]. All rights reserved. This information is  not intended as a substitute for professional medical care. Always follow your healthcare professional's instructions.

## 2017-06-14 NOTE — ED NOTES
Patient presents today with chest pain for past few days that has increasingly worsened. Also feels fatigued and has headache. Denies N/V, jaw pain, back pain, arm pain. Patient is AOX4. ABC's intact.

## 2017-06-15 ENCOUNTER — APPOINTMENT (OUTPATIENT)
Dept: CARDIOLOGY | Facility: CLINIC | Age: 62
DRG: 194 | End: 2017-06-15
Attending: PHYSICIAN ASSISTANT
Payer: COMMERCIAL

## 2017-06-15 LAB
CHOLEST SERPL-MCNC: 120 MG/DL
HDLC SERPL-MCNC: 38 MG/DL
INTERPRETATION ECG - MUSE: NORMAL
INTERPRETATION ECG - MUSE: NORMAL
LDLC SERPL CALC-MCNC: 65 MG/DL
LMWH PPP CHRO-ACNC: 0.11 IU/ML
LMWH PPP CHRO-ACNC: NORMAL IU/ML
LMWH PPP CHRO-ACNC: NORMAL IU/ML
NONHDLC SERPL-MCNC: 82 MG/DL
POTASSIUM SERPL-SCNC: 3.7 MMOL/L (ref 3.4–5.3)
PROCALCITONIN SERPL-MCNC: 0.13 NG/ML
TRIGL SERPL-MCNC: 87 MG/DL
TROPONIN I SERPL-MCNC: 0.13 UG/L (ref 0–0.04)

## 2017-06-15 PROCEDURE — 25000125 ZZHC RX 250: Performed by: INTERNAL MEDICINE

## 2017-06-15 PROCEDURE — 84484 ASSAY OF TROPONIN QUANT: CPT | Performed by: PHYSICIAN ASSISTANT

## 2017-06-15 PROCEDURE — 25000132 ZZH RX MED GY IP 250 OP 250 PS 637: Performed by: INTERNAL MEDICINE

## 2017-06-15 PROCEDURE — 93306 TTE W/DOPPLER COMPLETE: CPT

## 2017-06-15 PROCEDURE — 80061 LIPID PANEL: CPT | Performed by: PHYSICIAN ASSISTANT

## 2017-06-15 PROCEDURE — 84132 ASSAY OF SERUM POTASSIUM: CPT | Performed by: INTERNAL MEDICINE

## 2017-06-15 PROCEDURE — 25000128 H RX IP 250 OP 636: Performed by: PHYSICIAN ASSISTANT

## 2017-06-15 PROCEDURE — 25000125 ZZHC RX 250: Performed by: PHYSICIAN ASSISTANT

## 2017-06-15 PROCEDURE — 25000132 ZZH RX MED GY IP 250 OP 250 PS 637: Performed by: PHYSICIAN ASSISTANT

## 2017-06-15 PROCEDURE — 12000007 ZZH R&B INTERMEDIATE

## 2017-06-15 PROCEDURE — 93306 TTE W/DOPPLER COMPLETE: CPT | Mod: 26 | Performed by: INTERNAL MEDICINE

## 2017-06-15 PROCEDURE — 25000128 H RX IP 250 OP 636: Performed by: INTERNAL MEDICINE

## 2017-06-15 PROCEDURE — 36415 COLL VENOUS BLD VENIPUNCTURE: CPT | Performed by: INTERNAL MEDICINE

## 2017-06-15 PROCEDURE — 85520 HEPARIN ASSAY: CPT | Performed by: INTERNAL MEDICINE

## 2017-06-15 PROCEDURE — 36415 COLL VENOUS BLD VENIPUNCTURE: CPT | Performed by: PHYSICIAN ASSISTANT

## 2017-06-15 PROCEDURE — 99233 SBSQ HOSP IP/OBS HIGH 50: CPT | Performed by: INTERNAL MEDICINE

## 2017-06-15 RX ORDER — SIMVASTATIN 20 MG
20 TABLET ORAL EVERY EVENING
Status: DISCONTINUED | OUTPATIENT
Start: 2017-06-15 | End: 2017-06-17 | Stop reason: HOSPADM

## 2017-06-15 RX ORDER — HYDROCODONE BITARTRATE AND ACETAMINOPHEN 5; 325 MG/1; MG/1
1-2 TABLET ORAL EVERY 4 HOURS PRN
Status: DISCONTINUED | OUTPATIENT
Start: 2017-06-15 | End: 2017-06-17 | Stop reason: HOSPADM

## 2017-06-15 RX ORDER — IBUPROFEN 200 MG
400 TABLET ORAL EVERY 6 HOURS PRN
Status: DISCONTINUED | OUTPATIENT
Start: 2017-06-15 | End: 2017-06-15

## 2017-06-15 RX ADMIN — LOSARTAN POTASSIUM 25 MG: 25 TABLET ORAL at 08:07

## 2017-06-15 RX ADMIN — HEPARIN SODIUM 1250 UNITS/HR: 10000 INJECTION, SOLUTION INTRAVENOUS at 11:26

## 2017-06-15 RX ADMIN — METOPROLOL TARTRATE 12.5 MG: 25 TABLET, FILM COATED ORAL at 20:22

## 2017-06-15 RX ADMIN — Medication 2000 UNITS: at 19:36

## 2017-06-15 RX ADMIN — HYDROCODONE BITARTRATE AND ACETAMINOPHEN 1 TABLET: 5; 325 TABLET ORAL at 06:09

## 2017-06-15 RX ADMIN — ACETAMINOPHEN 650 MG: 325 TABLET, FILM COATED ORAL at 20:23

## 2017-06-15 RX ADMIN — SODIUM CHLORIDE: 9 INJECTION, SOLUTION INTRAVENOUS at 17:17

## 2017-06-15 RX ADMIN — ALPRAZOLAM 0.5 MG: 0.25 TABLET ORAL at 20:43

## 2017-06-15 RX ADMIN — ASPIRIN 325 MG ORAL TABLET 325 MG: 325 PILL ORAL at 08:07

## 2017-06-15 RX ADMIN — AMPICILLIN SODIUM AND SULBACTAM SODIUM 3 G: 2; 1 INJECTION, POWDER, FOR SOLUTION INTRAMUSCULAR; INTRAVENOUS at 06:15

## 2017-06-15 RX ADMIN — SODIUM CHLORIDE: 9 INJECTION, SOLUTION INTRAVENOUS at 08:12

## 2017-06-15 RX ADMIN — Medication 3300 UNITS: at 03:00

## 2017-06-15 RX ADMIN — AMPICILLIN SODIUM AND SULBACTAM SODIUM 3 G: 2; 1 INJECTION, POWDER, FOR SOLUTION INTRAMUSCULAR; INTRAVENOUS at 00:17

## 2017-06-15 RX ADMIN — AZITHROMYCIN 250 MG: 250 TABLET, FILM COATED ORAL at 10:28

## 2017-06-15 RX ADMIN — ACETAMINOPHEN 650 MG: 325 TABLET, FILM COATED ORAL at 12:50

## 2017-06-15 RX ADMIN — Medication 20 MG: at 20:22

## 2017-06-15 RX ADMIN — METOPROLOL TARTRATE 12.5 MG: 25 TABLET, FILM COATED ORAL at 08:07

## 2017-06-15 RX ADMIN — SERTRALINE HYDROCHLORIDE 150 MG: 100 TABLET ORAL at 08:07

## 2017-06-15 RX ADMIN — ACETAMINOPHEN 650 MG: 325 TABLET, FILM COATED ORAL at 17:00

## 2017-06-15 RX ADMIN — POTASSIUM CHLORIDE 10 MEQ: 14.9 INJECTION, SOLUTION, CONCENTRATE PARENTERAL at 08:08

## 2017-06-15 RX ADMIN — ACETAMINOPHEN 650 MG: 325 TABLET, FILM COATED ORAL at 08:07

## 2017-06-15 RX ADMIN — AMPICILLIN SODIUM AND SULBACTAM SODIUM 3 G: 2; 1 INJECTION, POWDER, FOR SOLUTION INTRAMUSCULAR; INTRAVENOUS at 18:19

## 2017-06-15 RX ADMIN — POTASSIUM CHLORIDE 10 MEQ: 14.9 INJECTION, SOLUTION, CONCENTRATE PARENTERAL at 01:37

## 2017-06-15 RX ADMIN — POTASSIUM CHLORIDE 10 MEQ: 14.9 INJECTION, SOLUTION, CONCENTRATE PARENTERAL at 09:12

## 2017-06-15 RX ADMIN — Medication 3400 UNITS: at 11:29

## 2017-06-15 RX ADMIN — HYDROCODONE BITARTRATE AND ACETAMINOPHEN 1 TABLET: 5; 325 TABLET ORAL at 01:37

## 2017-06-15 RX ADMIN — POTASSIUM CHLORIDE 10 MEQ: 14.9 INJECTION, SOLUTION, CONCENTRATE PARENTERAL at 03:28

## 2017-06-15 RX ADMIN — AMPICILLIN SODIUM AND SULBACTAM SODIUM 3 G: 2; 1 INJECTION, POWDER, FOR SOLUTION INTRAMUSCULAR; INTRAVENOUS at 11:24

## 2017-06-15 ASSESSMENT — ACTIVITIES OF DAILY LIVING (ADL)
COMMUNICATION: 0-->UNDERSTANDS/COMMUNICATES WITHOUT DIFFICULTY
SWALLOWING: 0-->SWALLOWS FOODS/LIQUIDS WITHOUT DIFFICULTY
COGNITION: 0 - NO COGNITION ISSUES REPORTED
BATHING: 0-->INDEPENDENT
TRANSFERRING: 0-->INDEPENDENT
DRESS: 0-->INDEPENDENT
AMBULATION: 0-->INDEPENDENT
TOILETING: 0-->INDEPENDENT
AMBULATION: 0-->INDEPENDENT
SWALLOWING: 0-->SWALLOWS FOODS/LIQUIDS WITHOUT DIFFICULTY
TOILETING: 0-->INDEPENDENT
BATHING: 0-->INDEPENDENT
RETIRED_COMMUNICATION: 0-->UNDERSTANDS/COMMUNICATES WITHOUT DIFFICULTY
RETIRED_EATING: 0-->INDEPENDENT
EATING: 0-->INDEPENDENT
DRESS: 0-->INDEPENDENT
FALL_HISTORY_WITHIN_LAST_SIX_MONTHS: NO
TRANSFERRING: 0-->INDEPENDENT

## 2017-06-15 NOTE — ED NOTES
"[facility]  ED Nurse Handoff Report    Zulema Carrillo is a 61 year old female   ED Chief complaint: Chest Pain  . ED Diagnosis:   Final diagnoses:   ACS (acute coronary syndrome) (H)   Hypokalemia   Left pulmonary infiltrate on CXR     Allergies:   Allergies   Allergen Reactions     Cephalexin      Ciprofloxacin      Glucosamine      Hydromorphone      Ibuprofen      Iodine      Lisinopril      Morphine Hives       Code Status: Full Code  Activity level - Baseline/Home:  Independent. Activity Level - Current:   Independent. Lift room needed: No. Bariatric: No   Needed: No   Isolation: No. Infection: Not Applicable.     Vital Signs:   Vitals:    06/14/17 1912 06/14/17 1915 06/14/17 1930 06/14/17 1945   BP:  (!) 122/91 129/79 126/80   Resp:       Temp:       TempSrc:       SpO2:  97% 97% 97%   Weight: 76.2 kg (168 lb)      Height: 1.702 m (5' 7\")          Cardiac Rhythm:  ,   Cardiac  Cardiac Rhythm: Normal sinus rhythm  Pain level: 0-10 Pain Scale: 0  Patient confused: No. Patient Falls Risk: Yes.   Elimination Status: Has voided   Patient Report - Initial Complaint: chest pain, SOB. Focused Assessment:   17:55 Cardiac Review of Systems (Cardiac) - Cardiac Signs/Symptoms: chest pain; shortness of breath  Chest Pain Assessment - Precipitating Factors: activity  Cardiac Monitoring - EKG Monitoring: Yes Cardiac Regularity: Regular Cardiac Rhythm: NSR  Chest Pain Assessment - Location: precordial  Cardiac - Cardiac WDL:  WDL except; all KB    17:55 Respiratory Respiratory - Respiratory WDL:  WDL except; all Rhythm/Pattern: shortness of breath reported Lung Fields: LLL; DERREK; RUL; RLL Nailbeds: no discoloration Cough Type: none DERREK Breath Sounds: crackles LLL Breath Sounds: crackles RUL Breath Sounds: clear RLL Breath Sounds: diminished          Tests Performed: labs, CT. Abnormal Results: Abnormal Result -   Troponin I ES: 0.302 ug/L [Ref Range: 0.000 - 0.045] (The 99th percentile for upper " reference range is 0.045 ug/L. Troponin values   in   the range of 0.045 - 0.120 ug/L may be associated with risks of adverse   clinical events.   Critical Value called to and read back by   MARSHAL ROLDAN ([facility]) ON 06/14/2017 AT 18:09 BY DNT   )  Collected: 6/14/2017 17:06  Last updated: 6/14/2017 18:09  .   Abnormal Result -   Sodium: 130 mmol/L [Ref Range: 133 - 144]  Potassium: 3.0 mmol/L [Ref Range: 3.4 - 5.3]  Chloride: 101 mmol/L [Ref Range: 94 - 109]  Carbon Dioxide: 21 mmol/L [Ref Range: 20 - 32]  Anion Gap: 8 mmol/L [Ref Range: 3 - 14]  Glucose: 111 mg/dL [Ref Range: 70 - 99]  Urea Nitrogen: 14 mg/dL [Ref Range: 7 - 30]  Creatinine: 0.73 mg/dL [Ref Range: 0.52 - 1.04]  GFR Estimate: 81 mL/min/1.7m2 [Ref Range: >60] (Non  GFR Calc)  GFR Estimate If Black: >90   African American GFR Calc   mL/min/1.7m2 [Ref Range: >60]  Calcium: 8.8 mg/dL [Ref Range: 8.5 - 10.1]  Collected: 6/14/2017 17:06  Last updated: 6/14/2017 18:05 FI    18:05 Nt probnp inpatient (BNP) Resulted Abnormal Result -   N-Terminal Pro BNP Inpatient: 2010 pg/mL [Ref Range: 0 - 900] (Reference range shown and results flagged as abnormal are suggested inpatient   cut points for confirming diagnosis if CHF in an acute setting. Establishing   a   baseline value for each individual patient is useful for follow-up. An   inpatient or emergency department NT-proPBNP <300 pg/mL effectively rules out   acute CHF, with 99% negative predictive value.   The outpatient non-acute reference range for ruling out CHF is:   0-125 pg/mL (age 18 to less than 75)   0-450 pg/mL (age 75 yrs and older)   )  Collected: 6/14/2017 17:06  Last updated: 6/14/2017 18:05     Abnormal Result -   WBC: 6.7 10e9/L [Ref Range: 4.0 - 11.0]  RBC Count: 4.12 10e12/L [Ref Range: 3.8 - 5.2]  Hemoglobin: 10.8 g/dL [Ref Range: 11.7 - 15.7]  Hematocrit: 32.7 % [Ref Range: 35.0 - 47.0]  MCV: 79 fl [Ref Range: 78 - 100]  MCH: 26.2 pg [Ref Range: 26.5 - 33.0]  MCHC:  33.0 g/dL [Ref Range: 31.5 - 36.5]  RDW: 13.6 % [Ref Range: 10.0 - 15.0]  Platelet Count: 156 10e9/L [Ref Range: 150 - 450]  Diff Method: Automated Method  % Neutrophils: 78.1 %  % Lymphocytes: 10.4 %  % Monocytes: 10.4 %  % Eosinophils: 0.6 %  % Basophils: 0.1 %  % Immature Granulocytes: 0.4 %  Nucleated RBCs: 0 /100 [Ref Range: 0]  Absolute Neutrophil: 5.2 10e9/L [Ref Range: 1.6 - 8.3]  Absolute Lymphocytes: 0.7 10e9/L [Ref Range: 0.8 - 5.3]  Absolute Monocytes: 0.7 10e9/L [Ref Range: 0.0 - 1.3]  Absolute Eosinophils: 0.0 10e9/L [Ref Range: 0.0 - 0.7]  Absolute Basophils: 0.0 10e9/L [Ref Range: 0.0 - 0.2]  Abs Immature Granulocytes: 0.0 10e9/L [Ref Range: 0 - 0.4]  Absolute Nucleated RBC: 0.0  Collected: 6/14/2017 17:06  Last updated: 6/14/2017 17:55  Treatments provided: heparin, nitro,   Family Comments:  at bedside  OBS brochure/video discussed/provided to patient:  N/A  ED Medications:   Medications   lidocaine 1 % 1 mL (not administered)   lidocaine (LMX4) kit (not administered)   sodium chloride (PF) 0.9% PF flush 3 mL (not administered)   sodium chloride (PF) 0.9% PF flush 3 mL (not administered)   nitroglycerin (NITROSTAT) sublingual tablet 0.4 mg (0.4 mg Sublingual Given 6/14/17 1942)   heparin infusion 25,000 units in 0.45% NaCl 250 mL (12 Units/kg/hr × 67.4 kg (Adjusted) Intravenous New Bag 6/14/17 1947)   aspirin chewable tablet 324 mg (324 mg Oral Given 6/14/17 1805)   iopamidol (ISOVUE-370) solution 500 mL (64 mLs Intravenous Given 6/14/17 1821)   0.9% sodium chloride BOLUS (0 mLs Intravenous Stopped 6/14/17 1823)   potassium chloride SA (K-DUR/KLOR-CON M) CR tablet 40 mEq (40 mEq Oral Given 6/14/17 1852)   heparin Loading Dose bolus dose from infusion pump 4,000 Units (4,000 Units Intravenous Given 6/14/17 1947)     Drips infusing:  Yes         ED Nurse Name/Phone Number: Rachael Medeiros,   7:50 PM    RECEIVING UNIT ED HANDOFF REVIEW    Above ED Nurse Handoff Report was reviewed: Yes  Reviewed  by: Loan Mccloud on June 14, 2017 at 8:24 PM .

## 2017-06-15 NOTE — PHARMACY-ADMISSION MEDICATION HISTORY
As requested by 6/14 pm med rec [facility], called [facility] pharmacy this morning to confirm statin.  Pt is on simvastatin 20mg qpm. PTA med list already has listed as PTA med.  No change made to PTA med list to date by this writer.

## 2017-06-15 NOTE — PHARMACY-ADMISSION MEDICATION HISTORY
Admission medication history interview status for this patient is complete. See [facility] admission navigator for allergy information, prior to admission medications and immunization status.     Medication history interview source(s):Patient  Medication history resources (including written lists, pill bottles, clinic record):[employer], [employer] Record from 06.14.2017  Primary pharmacy:[facility]    Changes made to PTA medication list:  Added: xanax, Z-Carlos (New prescription 6.14.2017, not yet picked up or started), Flonase, Multivit, Vitamin D, Omega 3, Zocor  Deleted: Lipitor, Imitrex   Changed: doses and frequencies for: losartan, sertraline    Actions taken by pharmacist (provider contacted, etc):None     Additional medication history information:On 6.15.2015 Pharmacy to verify Zocor versus Lipitor    Medication reconciliation/reorder completed by provider prior to medication history? No    Do you take OTC medications (eg tylenol, ibuprofen, fish oil, eye/ear drops, etc)? Y    For patients on insulin therapy: No        Prior to Admission medications    Medication Sig Last Dose Taking? Auth Provider   Losartan Potassium (COZAAR PO) Take 25 mg by mouth every morning  6/14/2017 at Unknown time Yes Reported, Patient   sertraline (ZOLOFT) 100 MG tablet Take 150 mg by mouth every morning 6/14/2017 at Unknown time Yes Unknown, Entered By History   VITAMIN D, CHOLECALCIFEROL, PO Take 2,000 Units by mouth every morning 6/14/2017 at am Yes Unknown, Entered By History   fluticasone (FLONASE) 50 MCG/ACT spray Spray 2 sprays into both nostrils daily 6/14/2017 at am Yes Unknown, Entered By History   multivitamin, therapeutic with minerals (THERA-VIT-M) TABS tablet Take 1 tablet by mouth daily Past Month at Unknown time Yes Unknown, Entered By History   omega 3 1000 MG CAPS Take 1,000 mg by mouth every morning 6/14/2017 at am Yes Unknown, Entered By History   Simvastatin (ZOCOR PO) Take 20 mg by mouth every evening  Patient has corn allergy, needs corn free medication. Patient uses [facility] . 6/13/2017 at pm Yes Unknown, Entered By History   ALPRAZolam (XANAX PO) Take 0.5 mg by mouth 3 times daily as needed for anxiety More than a month at Unknown time  Unknown, Entered By History   Azithromycin (ZITHROMAX Z-ANTELMO PO) Take by mouth daily 500 mg on day one, then 250 mg daily for four days Not started yet, at Not picked up from pharmacy  Unknown, Entered By History

## 2017-06-15 NOTE — PLAN OF CARE
Problem: Goal Outcome Summary  Goal: Goal Outcome Summary  Outcome: No Change  Pt. AxOx4, up with SBA, Tele: SR, IVF and Heparin infusing without difficulty, Heparin infusing at 1000 units/hr, Hep 10a due at 0900. Potassium level=3.3, replacement being given but slowly due c/o discomfort while infusing at faster rate. IV site intact, no s/s of infiltration. Lung sounds diminished, fine crackles to bases, room air sat's at 95-97%. Plan: Tele, monitor troponin level (0.33, 0.302, 0.225), Echo to be done today, Heparin drip, cardiology consult, I.S., Electrolyte replacement, Abx (unasyn, zithromax). Pt. Denies any needs at this time. Will continue with POC.

## 2017-06-15 NOTE — PROGRESS NOTES
Ms. Zulema Carrillo is a pleasant 61 y.o. Woman w/ HTN, HLD and prominent FH of CAD (mother had CABG around age 62) who recently has had malaise and fevers and chills at home now with the addition of classic anginal symptoms (exertional precordial chest pressure associated with dyapnea with radiation to the jaw relieved with nitroglycerine) concerning for NSTEMI/unstable angina.  She hasn't had much productive cough but CT scan clearly shows a significant appearing LLL opacity so in the setting of recent fevers/chills at home and dyspnea would favor treating for community acquired pneumonia in addition to ACS.  She has been pain free since getting NTG in the ER.

## 2017-06-15 NOTE — PLAN OF CARE
Problem: Goal Outcome Summary  Goal: Goal Outcome Summary  Outcome: No Change  Alert and oriented x4. Independent in room. Family at bedside. VSS except for fevers, tmax of 101.4. PRN tylenol provided. Paged Dr. Whatley due to continued fever of 101 after tylenol and pt is allergic to ibuprofen. Blankets removed, room temperature turned down, ice packs applied. Echo performed, waiting for cardiology to consult. Potassium replaced, re-check was 3.7. IV fluids and heparin gtt infusing per orders. Pt received a heparin bolus of 3400 units this shift. Troponin this morning was 0.129, trending down. See paper chart for telemetry strip. Crackles noted to LLL, encouraged incentive spirometry use.

## 2017-06-15 NOTE — PROGRESS NOTES
"  [facility]  Hospitalist Progress Note  Chris Whatley MD 06/15/2017    Reason for Stay (Diagnosis): PNA, NSTEMI         Assessment and Plan:      Summary of Stay: Zulema Carrillo is a 61 y.o. Woman w/ HTN, HLD  who presented 6/14 with malaise and fevers and chills at home now with the addition of classic anginal symptoms (exertional precordial chest pressure associated with dyapnea with radiation to the jaw relieved with nitroglycerine) concerning for NSTEMI/unstable angina.  She hasn't had much productive cough but CT scan clearly shows a significant appearing LLL opacity so in the setting fevers/chills/dyspnea would favor treating for community acquired pneumonia in addition to ACS.  She has been pain free since getting NTG in the ER.  Troponin peaked at 0.3    1.  ACS/NSTEMI:  CP free.  Continue heparin gtt, asa, statin, b-blocker, ARB.  TTE with normal LV ftn.  Cards consult ordered.    2.  CAP  Continue Unasyn and azithro (FQ and cephalosporin allergies)  3.  HTN hx  4.  Hyperlipidemia hx; on Zocor      DVT Prophylaxis: heparin gtt  Code Status: Full Code  Discharge Dispo: home  Estimated Disch Date / # of Days until Disch: unclear; likely 2-3 days        Interval History (Subjective):      Feels much better than yesterday.  No CP                  Physical Exam:      Last Vital Signs:  /66 (BP Location: Right arm)  Temp 100  F (37.8  C)  Resp 18  Ht 1.702 m (5' 7\")  Wt 78.7 kg (173 lb 9.6 oz)  SpO2 94%  BMI 27.19 kg/m2    No intake or output data in the 24 hours ending 06/15/17 1246    Constitutional: Awake, alert, cooperative, no apparent distress.  Family present   Respiratory: Clear to auscultation bilaterally, no crackles or wheezing   Cardiovascular: Regular rate and rhythm, normal S1 and S2, and no murmur noted   Abdomen: Normal bowel sounds, soft, non-distended, non-tender   Skin: No rashes, no cyanosis, dry to touch   Neuro: Alert and oriented x3, no weakness, numbness, " memory loss   Extremities: No edema, normal range of motion   Other(s):        All other systems: Negative          Medications:      All current medications were reviewed with changes reflected in problem list.         Data:      All new lab and imaging data was reviewed.   Labs:    Recent Labs  Lab 06/14/17  1706   WBC 6.7   HGB 10.8*   HCT 32.7*   MCV 79         Imaging:   Recent Results (from the past 24 hour(s))   Chest CT, IV contrast only - PE protocol    Narrative    CT CHEST PULMONARY EMBOLISM W CONTRAST 6/14/2017 6:29 PM     HISTORY: Chest pain    CONTRAST DOSE:  64mL isovue-370    Radiation dose for this scan was reduced using automated exposure  control, adjustment of the mA and/or kV according to patient size, or  iterative reconstruction technique.    FINDINGS: There is a good contrast bolus within the pulmonary  arteries. No pulmonary arterial filling defects are demonstrated to  indicate pulmonary embolism. Although contrast enhancement within the  aorta is less optimal, no aortic dissection is demonstrated. The  mediastinum and sabina appear within normal limits. Patchy alveolar  opacity is noted within the left lower lobe with air bronchograms.  Mild linear atelectasis or fibrosis is also noted in the lower aspect  of the left upper lobe. The right lung appears clear. No apparent  pleural effusion.      Impression    IMPRESSION:  1. No CT evidence of pulmonary embolism.  2. Left lower lobe alveolar opacity. While nonspecific, this is  suspicious for pneumonia.    YANETH MORSE MD

## 2017-06-15 NOTE — H&P
[facility]    Hospitalist History and Physical    Name: Zulema Carrillo    MRN: [id number]  YOB: 1955    Age: 61 year old  Date of Admission:  6/14/2017  Date of Service (when I saw the patient): 06/14/17    Assessment & Plan   Zulema Carrillo is a 61 year old female with PMH significant for HTN, HLD, MVP, and GERD who presents to the ED after being seen at [facility] earlier today for chest pain and diagnosed with pneumonia. The patient was contacted once at home due to an elevated troponin level and recommended she present to the ED for further evaluation. ED workup reveals: VSS, sodium of 130, potassium of 3.0, BNP of 2010, troponin of 0.302, Hgb  Of 10.8, platelets of 156, EKG tracing showing rate of 85 bpm in NSR with nonspecific ST and T wave abnormality at 16:69 with repeat EKG at 18:44 shows rate of 85 bpm in NSR, cannot rule out anterior infarct, age undetermined, and CT of chest PE protocol shows left lower lobe alveolar opacity, suspicious for pneumonia and negative for PE. While in the ED the patient received 1 sublingual nitroglycerin which resolved her chest pain, IV Heparin gtt started, and given 40 mEq KCl as well 325 mg ASA. Case was discussed with cardiology while patient was in the ED.     1. ACS: intermittent exertional left sided and central chest tightness with associated dyspnea with radiation into her left jaw concerning for unstable angina. No distinct ischemic changes seen on EKG. Initial troponin at Urgent Care of 0.47 with repeat troponin here of 0.302. Chest pain relieved in the ED with 1 sublingual nitroglycerin. No prior h/o CAD.   - Monitor on telemetry  - Serial troponins  - Initiate low dose Metoprolol and continue Losartan   - Check lipid panel in AM  - Obtain echocardiogram to evaluate for wall motion abnormalities   - Switch Simvastatin to high potency Atorvastatin   - Continue IV Heparin gtt  - NPO at midnight in case of angiogram  tomorrow   - Cardiology consult ordered for recommendations     2. Left lower lobe pneumonia: subjective fevers at home, mild nonproductive cough, congestion, myalgias, and malaise for the last few days along with dyspnea with CXR at [facility] consistent with patchy consolidation of left lower lobe concerning for pneumonia. CT scan of chest today ruled out PE but did show left lower lobe aveolar opacity suspicious for pneumonia. No leukocytosis or hypoxia but the patient did spike a fever of 102.4 once on the floor. Will add on procalcitonin to further confirm suspicion for CAP.   - Add on procalcitonin   - Due to reports of an allergic reaction to cephalosporins with having a rash, will give Augmentin and Azithromycin for antibiotics   - Monitor fevers    3. Hyponatremia: sodium of 130, suspect this is related to mild dehydration.  - IVF with NS overnight   - Recheck BMP in AM    4. Hypokalemia: potassium of 3.0, received 40 mEq KCl in the ED.  - Replace per protocol  - Check BMP in AM     5. Depression: continue Sertraline      DVT Prophylaxis: on IV Heparin gtt  Code Status: Full Code  Disposition: Expected stay >2 midnights     Primary Care Physician   GERSON CHARLES    Chief Complaint   Chest pain    History obtained from discussion with ED provider, chart review, and interview with patient.      History of Present Illness   Zulema Carrillo is a 61 year old female who presents with chest pain. Patient reports that on Monday of this week she went to an exercise class and was only able to complete a mile and had to stop due to exhaustion and feeling extremely short of breath. The patient states that she thinks she is out of shape but not that bad and is usually able to complete more than that. 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 When  the patient rests her dyspnea resolves. Patient reports over the last 2-3 days she has been having intermittent left sided as well central chest tightness that radiates into left jaw with exertional dyspnea. Her pain lasts about 5 minutes along with diaphoresis then resolves on its own. She also notes having intermittent lightheadedness and dizziness over the last few weeks. Denies palpitations, nausea, vomiting, or abdominal pain. Since the beginning of this week the patient complains of fatigue, nonproductive cough, chills, subjective fever, congestion, and overall exhaustion. She reports having a loose stools over the last couple days but denies diarrhea. Denies recent medication changes and no recent travel. 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 No prior h/o CAD, DM, or pneumonia. Patient does have a family history of her mother undergoing a CABG at age 62 as well other relatives on her maternal side having CAD. Due to her family history the patient did have cardiac evaluation in 2015 involving an exercise stress test that was normal. The patient was initially seen at Urgent Care this afternoon for her symptoms and diagnosed with CAP and prescribed a course of antibiotics. When the patient left she had a troponin level pending and was contacted at home to be informed that the level was high and she should present to the ED for further evaluation and thus it was recommended she be admitted. Patient's chest pain was relived in the ED with nitroglycerin and she is currently chest pain free.      Past Medical History    HTN  HLD  MVP    Past Surgical History   Prophylactic mastectomy, hysterectomy, and oophorectomy in 2005  Breast Reconstruction in 2006    Prior to Admission Medications   Prior to Admission Medications   Prescriptions Last Dose Informant Patient Reported? Taking?   Atorvastatin Calcium (LIPITOR PO)   Yes Yes   Sig: Take 10 mg by mouth    Losartan Potassium (COZAAR PO)   Yes Yes   SUMAtriptan Succinate (IMITREX PO)   Yes Yes   Sig: Take 50 mg by mouth   Sertraline HCl (ZOLOFT PO)   Yes Yes   Sig: Take 150 mg by mouth daily      Facility-Administered Medications: None     Allergies   Allergies   Allergen Reactions     Cephalexin      Ciprofloxacin      Glucosamine      Hydromorphone      Ibuprofen      Iodine      Lisinopril      Morphine Hives     Social History   Social History   Substance Use Topics     Smoking status: Never smoker     Smokeless tobacco: No     Alcohol use One drink once a week     Social History     Social History Narrative     Family History   Family history reviewed with patient and significant for mother underwent CABG at age 62 and multiple relatives on maternal side with h/o CAD.    Review of Systems   A Comprehensive greater than 10 system review of systems was carried out.  Pertinent positives and negatives are noted above.  Otherwise negative for contributory information.    Physical Exam   Temp: 98.5  F (36.9  C) Temp src: Oral BP: 126/80   Heart Rate: 82 Resp: 16 SpO2: 97 % O2 Device: None (Room air)    Vital Signs with Ranges  Temp:  [98.5  F (36.9  C)] 98.5  F (36.9  C)  Heart Rate:  [81-87] 82  Resp:  [16] 16  BP: (119-133)/(78-94) 126/80  SpO2:  [96 %-98 %] 97 %  168 lbs 0 oz     GEN:  Alert, oriented x 3, appears comfortable, no overt distress  HEENT: Normocephalic/atraumatic, no scleral icterus, no nasal discharge, mouth moist.  CV:  Regular rate and rhythm, no murmur or JVD.  S1 + S2 noted, no S3 or S4.  LUNGS:  Clear to auscultation bilaterally except for crackles in left lung base without rales/rhonchi/wheezing/retractions. Symmetric chest rise on inhalation noted.  ABD:  Active bowel sounds, soft, non-tender/non-distended.  No rebound/guarding/rigidity.  EXT:  No edema. No cyanosis. No acute joint synovitis noted.  SKIN:  Dry to touch, no exanthems noted in the visualized areas.  NEURO:  Symmetric muscle  strength, sensation to touch grossly intact. Coordination symmetric on general exam. No new focal deficits appreciated.    Data   Data reviewed today:  I personally reviewed the EKG tracing showing rate of 85 bpm in NSR with nonspecific ST and T wave abnormality at 16:69. Repeat EKG at 18:44 shows rate of 85 bpm in NSR, cannot rule out anterior infarct, age undetermined.     Results for orders placed or performed during the hospital encounter of 06/14/17   Chest CT, IV contrast only - PE protocol    Narrative    CT CHEST PULMONARY EMBOLISM W CONTRAST 6/14/2017 6:29 PM     HISTORY: Chest pain    CONTRAST DOSE:  64mL [id number]    Radiation dose for this scan was reduced using automated exposure  control, adjustment of the mA and/or kV according to patient size, or  iterative reconstruction technique.    FINDINGS: There is a good contrast bolus within the pulmonary  arteries. No pulmonary arterial filling defects are demonstrated to  indicate pulmonary embolism. Although contrast enhancement within the  aorta is less optimal, no aortic dissection is demonstrated. The  mediastinum and sabina appear within normal limits. Patchy alveolar  opacity is noted within the left lower lobe with air bronchograms.  Mild linear atelectasis or fibrosis is also noted in the lower aspect  of the left upper lobe. The right lung appears clear. No apparent  pleural effusion.      Impression    IMPRESSION:  1. No CT evidence of pulmonary embolism.  2. Left lower lobe alveolar opacity. While nonspecific, this is  suspicious for pneumonia.   CBC with platelets differential   Result Value Ref Range    WBC 6.7 4.0 - 11.0 10e9/L    RBC Count 4.12 3.8 - 5.2 10e12/L    Hemoglobin 10.8 (L) 11.7 - 15.7 g/dL    Hematocrit 32.7 (L) 35.0 - 47.0 %    MCV 79 78 - 100 fl    MCH 26.2 (L) 26.5 - 33.0 pg    MCHC 33.0 31.5 - 36.5 g/dL    RDW 13.6 10.0 - 15.0 %    Platelet Count 156 150 - 450 10e9/L    Diff Method Automated Method     % Neutrophils 78.1 %    %  Lymphocytes 10.4 %    % Monocytes 10.4 %    % Eosinophils 0.6 %    % Basophils 0.1 %    % Immature Granulocytes 0.4 %    Nucleated RBCs 0 0 /100    Absolute Neutrophil 5.2 1.6 - 8.3 10e9/L    Absolute Lymphocytes 0.7 (L) 0.8 - 5.3 10e9/L    Absolute Monocytes 0.7 0.0 - 1.3 10e9/L    Absolute Eosinophils 0.0 0.0 - 0.7 10e9/L    Absolute Basophils 0.0 0.0 - 0.2 10e9/L    Abs Immature Granulocytes 0.0 0 - 0.4 10e9/L    Absolute Nucleated RBC 0.0    Basic metabolic panel   Result Value Ref Range    Sodium 130 (L) 133 - 144 mmol/L    Potassium 3.0 (L) 3.4 - 5.3 mmol/L    Chloride 101 94 - 109 mmol/L    Carbon Dioxide 21 20 - 32 mmol/L    Anion Gap 8 3 - 14 mmol/L    Glucose 111 (H) 70 - 99 mg/dL    Urea Nitrogen 14 7 - 30 mg/dL    Creatinine 0.73 0.52 - 1.04 mg/dL    GFR Estimate 81 >60 mL/min/1.7m2    GFR Estimate If Black >90   GFR Calc   >60 mL/min/1.7m2    Calcium 8.8 8.5 - 10.1 mg/dL   Troponin I   Result Value Ref Range    Troponin I ES 0.302 (HH) 0.000 - 0.045 ug/L   Nt probnp inpatient (BNP)   Result Value Ref Range    N-Terminal Pro BNP Inpatient 2010 (H) 0 - 900 pg/mL   EKG 12 lead   Result Value Ref Range    Interpretation ECG Click View Image link to view waveform and result    EKG 12 lead   Result Value Ref Range    Interpretation ECG Click View Image link to view waveform and result    Troponin POCT   Result Value Ref Range    Troponin I 0.33 (HH) 0.00 - 0.10 ug/L       Sarah MATTHEWS I discussed the patient with Dr. Sweet and he agrees with the above plan.

## 2017-06-16 LAB
LMWH PPP CHRO-ACNC: 0.11 IU/ML
LMWH PPP CHRO-ACNC: 0.21 IU/ML
POTASSIUM SERPL-SCNC: 3.4 MMOL/L (ref 3.4–5.3)

## 2017-06-16 PROCEDURE — 25000132 ZZH RX MED GY IP 250 OP 250 PS 637: Performed by: PHYSICIAN ASSISTANT

## 2017-06-16 PROCEDURE — 99221 1ST HOSP IP/OBS SF/LOW 40: CPT | Performed by: INTERNAL MEDICINE

## 2017-06-16 PROCEDURE — 85520 HEPARIN ASSAY: CPT | Performed by: INTERNAL MEDICINE

## 2017-06-16 PROCEDURE — 25000128 H RX IP 250 OP 636: Performed by: INTERNAL MEDICINE

## 2017-06-16 PROCEDURE — 84132 ASSAY OF SERUM POTASSIUM: CPT | Performed by: INTERNAL MEDICINE

## 2017-06-16 PROCEDURE — 12000007 ZZH R&B INTERMEDIATE

## 2017-06-16 PROCEDURE — 36415 COLL VENOUS BLD VENIPUNCTURE: CPT | Performed by: INTERNAL MEDICINE

## 2017-06-16 PROCEDURE — 99207 ZZC CDG-MDM COMPONENT: MEETS MODERATE - DOWN CODED: CPT | Performed by: INTERNAL MEDICINE

## 2017-06-16 PROCEDURE — 25000125 ZZHC RX 250: Performed by: INTERNAL MEDICINE

## 2017-06-16 PROCEDURE — 99232 SBSQ HOSP IP/OBS MODERATE 35: CPT | Performed by: INTERNAL MEDICINE

## 2017-06-16 RX ADMIN — HEPARIN SODIUM 1500 UNITS/HR: 10000 INJECTION, SOLUTION INTRAVENOUS at 03:17

## 2017-06-16 RX ADMIN — AMPICILLIN SODIUM AND SULBACTAM SODIUM 3 G: 2; 1 INJECTION, POWDER, FOR SOLUTION INTRAMUSCULAR; INTRAVENOUS at 00:33

## 2017-06-16 RX ADMIN — AMPICILLIN SODIUM AND SULBACTAM SODIUM 3 G: 2; 1 INJECTION, POWDER, FOR SOLUTION INTRAMUSCULAR; INTRAVENOUS at 12:21

## 2017-06-16 RX ADMIN — METOPROLOL TARTRATE 12.5 MG: 25 TABLET, FILM COATED ORAL at 21:11

## 2017-06-16 RX ADMIN — AMPICILLIN SODIUM AND SULBACTAM SODIUM 3 G: 2; 1 INJECTION, POWDER, FOR SOLUTION INTRAMUSCULAR; INTRAVENOUS at 17:16

## 2017-06-16 RX ADMIN — ASPIRIN 325 MG ORAL TABLET 325 MG: 325 PILL ORAL at 10:30

## 2017-06-16 RX ADMIN — ACETAMINOPHEN 650 MG: 325 TABLET, FILM COATED ORAL at 00:33

## 2017-06-16 RX ADMIN — METOPROLOL TARTRATE 12.5 MG: 25 TABLET, FILM COATED ORAL at 10:31

## 2017-06-16 RX ADMIN — Medication 2000 UNITS: at 02:54

## 2017-06-16 RX ADMIN — ACETAMINOPHEN 650 MG: 325 TABLET, FILM COATED ORAL at 21:11

## 2017-06-16 RX ADMIN — AZITHROMYCIN 250 MG: 250 TABLET, FILM COATED ORAL at 10:31

## 2017-06-16 RX ADMIN — AMPICILLIN SODIUM AND SULBACTAM SODIUM 3 G: 2; 1 INJECTION, POWDER, FOR SOLUTION INTRAMUSCULAR; INTRAVENOUS at 06:21

## 2017-06-16 RX ADMIN — LOSARTAN POTASSIUM 25 MG: 25 TABLET ORAL at 10:31

## 2017-06-16 RX ADMIN — Medication 20 MG: at 21:12

## 2017-06-16 RX ADMIN — SERTRALINE HYDROCHLORIDE 150 MG: 100 TABLET ORAL at 10:30

## 2017-06-16 RX ADMIN — ENOXAPARIN SODIUM 40 MG: 40 INJECTION SUBCUTANEOUS at 15:50

## 2017-06-16 RX ADMIN — ACETAMINOPHEN 650 MG: 325 TABLET, FILM COATED ORAL at 10:31

## 2017-06-16 RX ADMIN — ACETAMINOPHEN 650 MG: 325 TABLET, FILM COATED ORAL at 06:25

## 2017-06-16 NOTE — PLAN OF CARE
Problem: Goal Outcome Summary  Goal: Goal Outcome Summary  Outcome: No Change  VSS, T max 103.1, PRN tylenol given and MD text paged.  94% on RA.  AAOx4.  Up SBA.  Denies pain.  PRN xanax given in beginning of shift for anxiety.  Tele reads SR.  Heparin gtt increased to 1500 units/hr and two boluses of 2000 units given.  IV Unasyn.  LS- diminished, coarse to posterior,  Infrequent cough, some dyspnea upon exertoin.  Voiding well.  Took a shower this AM.  Continue to monitor.

## 2017-06-16 NOTE — CONSULTS
Pretty nasty LL pneumonia - sick for a couple weeks likely  Vague chest tightness noted withoput any obvious wheezing  CT of the chest => no PE  Troponin trace +  Echo:    No regional wall motion abnormalities noted.  Left ventricular systolic function is normal.  The visual ejection fraction is estimated at 60-65%.  The left ventricle is normal in size.  There is mild concentric left ventricular hypertrophy.  Doppler interrogation does not demonstrate significant stenosis or  insufficency involviing cardiac valves.    No prior heart issues    Imp:    Chest pressure due to pneumonia  Stress induced + troponin  Doubt USA or MI   Lexiscan thallium next week Monday if in the hospital.   If not => do as an OP.  DC heparin

## 2017-06-16 NOTE — PROGRESS NOTES
"Admitting MD text paged: \"FYI pt temp of 103.1, now 102.5.  PRN tylenol given. Please advise as neccesary. Thanks. \"  "

## 2017-06-16 NOTE — PLAN OF CARE
Problem: Goal Outcome Summary  Goal: Goal Outcome Summary  Outcome: Improving  Ambulatory Status:  Pt up independently.  VS:  Stable, afebrile on shift- t max of 99.3  Pain:  5/10 HA  Resp: LS diminished but coarse  On room air  Cardiac: TELE: SR  GI:  denies nausea.  fair appetite and on regular no caffeine diet.  BS active.  Passing flatus.  Last BM 6/14.  :  Denies concerns  Skin:  Intact  Tx:  IV abx, heparin drips discontinued  Labs:  K 3.4  Consults:  Cards  Disposition:  TBD- 1-2 days will continue to monitor.  Charting completed for 12 hr shift

## 2017-06-16 NOTE — CONSULTS
CARDIOLOGY CONSULTATION       PRIMARY CARE PHYSICIAN:  Dr. Ana Paula Jett.       REQUESTING PHYSICIANS:  Dr. Chris Whatley and Dr. Justin Sweet.       HISTORY OF PRESENT ILLNESS:  We were asked by Dr. Whatley and Dr. Sweet to see Zulema Carrillo acutely because of the medical problems as listed below, including chest pressure, left lung pneumonia and positive troponins.      Zulema Carrillo is 61 years old.  She has been a very healthy person.  She has no major systemic illnesses she has been treated for.  She was admitted 2 days ago to [facility] because of a progressive pattern over a couple weeks of diminished energy, diminished endurance, diminished appetite, low-grade fever, shortness of breath and intermittent tightness in the chest.  She had some radiation to her jaw, although it was unclear as she had never had this feeling before.  CT scan revealed a significant left lower lobe infiltrate - likely pneumonia and she has been treated with antibiotics accordingly.  Subsequently, she has been afebrile, she has developed a cough, her breathing feels better and the vague sense of chest tightness has improved.      She has not had a history of coronary disease.  She is not a smoker.  She does have a mother who had coronary artery bypass surgery at age 62.  She is active.  She exercises regularly and has never had exertional chest pressure.      PAST MEDICAL HISTORY:  Includes:    1.  Mild hypertension.   2.  History of mitral valve prolapse.    3.  Prophylactic mastectomy, hysterectomy and oophorectomy in 2005, breast reduction surgery in 2006.      DRUG INTOLERANCES:  Listed in Epic including cephalexin, ciprofloxacin, Glucosamine, hydromorphone, ibuprofen, iodine, lisinopril and morphine.      Since admission to the hospital, she has been feeling better.  Her appetite has improved slightly.  She is coughing and producing a little touch of sputum.  She has not had any blood in her  sputum.  She has not had a classic rigor or shaking chills.      REVIEW OF SYSTEMS:   HEENT:  Negative.   CARDIORESPIRATORY:  As noted above.     UPPER GASTROINTESTINAL:  Diminished appetite, but no vomiting.   LOWER GASTROINTESTINAL:  Negative   GENITOURINARY:  Negative.   MUSCULOSKELETAL, ENDOCRINE, PSYCHIATRIC, HEMATOLOGIC:  All negative.      LABORATORY:  Since admission laboratory work has revealed a normal white count, hemoglobin diminished at 10.8 grams, cause unclear.  BNP 2010, electrolytes normal, potassium low at 3.0.  Potassium replacement has been maintained.  Creatinine 0.7, BUN 14, troponins 0.3, 0.2, and 0.1.      MEDICATIONS PRIOR TO ADMISSION:   1.  Losartan 25 mg a day.   2.  Zoloft 100 mg a day.   3.  Calcium with vitamin D daily.   4.  Flonase inhaled p.r.n.    5.  Omega-3 fatty acids.    6.  Simvastatin 20 mg at bedtime.   7.  Xanax p.r.n.    8.  Zithromax p.r.n.      SOCIAL HISTORY:  She is .  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  She drives a car, does housework and gardening and yard work.  She never smoked, she does not drink.  She does exercise regularly.      PHYSICAL EXAMINATION:   IN GENERAL:  Well-developed, well-nourished female, looks bright, alert, oriented and cooperative, but a little bit tired.   HEAD:  Normal.   NECK:  Free of neck vein distention, mass, bruit.   HEART:  Regular without gallop or murmur.   LUNGS:  Show trace rales in the left mid lung, otherwise clear.   ABDOMEN:  Soft without organomegaly.   EXTREMITIES:  Show +2 pedal pulses, no edema.   NEUROLOGIC/CUTANEOUS:  Observations are normal.   VITAL SIGNS:  Today show her blood pressure at 110/60, heart rate 75 beats a minute, O2 sats 95% on room air.  Body weight on admission 168 pounds, now up to 172 pounds.      DISCUSSION:  Zulema Carrillo did indeed have some chest pressure.  I suspect this is due to this very significant and good sized pneumonia in  her left lung.  Troponins are trace positive.  The EKG is nondiagnostic.  She has not had anything to suggest angina until she came in the hospital with this event.  I have recommended when she feels better, probably Monday, that she have a Lexiscan thallium study to rule out significant, albeit unexpected ischemia.  I believe if her stress test is normal, I do not believe this is a myocardial infarct, I believe it is a stress-induced troponin rise.  I have reviewed this with her and her family.  Her echo was normal.  Her wall motion is normal and we really need to rule out significant CAD.  I do not believe an angiogram is needed at this time, stress nuclear study on Monday.      Over 45 minutes was spent doing the consult.  Multiple records, imaging, EKGs, lab work, history, medications, medication side effects, indications for the Lexiscan thallium study were all reviewed with the patient and the family at length.         YASMEEN CHACON MD, [facility]             D: 2017 11:09   T: 2017 11:47   MT: [id number]      Name:     ALEX CONTRERAS   MRN:      [id number]        Account:       [id number]   :      1955           Consult Date:  2017      Document: [id number]       cc: Justin Jett MD

## 2017-06-16 NOTE — PROGRESS NOTES
"[facility]  Hospitalist Progress Note  Chris Whatley MD 06/16/2017    Reason for Stay (Diagnosis): PNA         Assessment and Plan:      Summary of Stay: Zulema Carrillo is a 61 y.o. Woman w/ HTN, HLD  who presented 6/14 with malaise and fevers and chills at home now with the addition of classic anginal symptoms (exertional precordial chest pressure associated with dyapnea with radiation to the jaw relieved with nitroglycerin) concerning for NSTEMI/unstable angina.  She hasn't had much productive cough but CT scan clearly shows a significant appearing LLL opacity so in the setting fevers/chills/dyspnea are treating for community acquired pneumonia.  Troponin peaked at 0.3 and there was initial concern for ACS, but cardiology has seen pt and suspect likely stress/demand ischemia from PNA     1.  troponin elevation:  CP free.  Continue asa, statin, b-blocker, ARB.  TTE with normal LV ftn.  Cards consult ordered and feel that pt likely has stress/demand ischemia.  Heparin gtt stopped and cards planning NM stress test on Monday  2.  CAP  Continue Unasyn and azithro (FQ and cephalosporin allergies).  Fever curve finally appears to be improving.  If clinically worsens would broaden to include vanco but doubt that will be necessary.  She is feeling better  3.  HTN hx  4.  Hyperlipidemia hx; on Zocor        DVT Prophylaxis: lovenox  Code Status: Full Code  Discharge Dispo: home  Estimated Disch Date / # of Days until Disch: unclear; likely 2-3 days        Interval History (Subjective):      Feeling better; still spiking temps but fever curve seems to be improving today                  Physical Exam:      Last Vital Signs:  /64 (BP Location: Right arm)  Temp 99.1  F (37.3  C) (Oral)  Resp 16  Ht 1.702 m (5' 7\")  Wt 78.1 kg (172 lb 2 oz)  SpO2 95%  BMI 26.96 kg/m2      Intake/Output Summary (Last 24 hours) at 06/16/17 0009  Last data filed at 06/16/17 0621   Gross per 24 hour   Intake          "     908 ml   Output                0 ml   Net              908 ml       Constitutional: Awake, alert, cooperative, no apparent distress   Respiratory: Clear to auscultation bilaterally, no crackles or wheezing   Cardiovascular: Regular rate and rhythm, normal S1 and S2, and no murmur noted   Abdomen: Normal bowel sounds, soft, non-distended, non-tender   Skin: No rashes, no cyanosis, dry to touch   Neuro: Alert and oriented x3, no weakness, numbness, memory loss   Extremities: No edema, normal range of motion   Other(s):        All other systems: Negative          Medications:      All current medications were reviewed with changes reflected in problem list.         Data:      All new lab and imaging data was reviewed.   Labs:    Recent Labs  Lab 06/14/17  1706   WBC 6.7   HGB 10.8*   HCT 32.7*   MCV 79         Imaging:   No results found for this or any previous visit (from the past 24 hour(s)).

## 2017-06-17 VITALS
WEIGHT: 171.4 LBS | SYSTOLIC BLOOD PRESSURE: 135 MMHG | OXYGEN SATURATION: 96 % | TEMPERATURE: 98.1 F | BODY MASS INDEX: 26.9 KG/M2 | DIASTOLIC BLOOD PRESSURE: 84 MMHG | HEIGHT: 67 IN | RESPIRATION RATE: 16 BRPM

## 2017-06-17 LAB
ANION GAP SERPL CALCULATED.3IONS-SCNC: 7 MMOL/L (ref 3–14)
BASOPHILS # BLD AUTO: 0 10E9/L (ref 0–0.2)
BASOPHILS NFR BLD AUTO: 0.2 %
BUN SERPL-MCNC: 8 MG/DL (ref 7–30)
CALCIUM SERPL-MCNC: 8.7 MG/DL (ref 8.5–10.1)
CHLORIDE SERPL-SCNC: 112 MMOL/L (ref 94–109)
CO2 SERPL-SCNC: 23 MMOL/L (ref 20–32)
CREAT SERPL-MCNC: 0.76 MG/DL (ref 0.52–1.04)
DIFFERENTIAL METHOD BLD: ABNORMAL
EOSINOPHIL # BLD AUTO: 0.2 10E9/L (ref 0–0.7)
EOSINOPHIL NFR BLD AUTO: 4.4 %
ERYTHROCYTE [DISTWIDTH] IN BLOOD BY AUTOMATED COUNT: 14 % (ref 10–15)
GFR SERPL CREATININE-BSD FRML MDRD: 78 ML/MIN/1.7M2
GLUCOSE SERPL-MCNC: 99 MG/DL (ref 70–99)
HCT VFR BLD AUTO: 30.4 % (ref 35–47)
HGB BLD-MCNC: 9.7 G/DL (ref 11.7–15.7)
IMM GRANULOCYTES # BLD: 0 10E9/L (ref 0–0.4)
IMM GRANULOCYTES NFR BLD: 0.5 %
LYMPHOCYTES # BLD AUTO: 0.8 10E9/L (ref 0.8–5.3)
LYMPHOCYTES NFR BLD AUTO: 20.6 %
MCH RBC QN AUTO: 25.9 PG (ref 26.5–33)
MCHC RBC AUTO-ENTMCNC: 31.9 G/DL (ref 31.5–36.5)
MCV RBC AUTO: 81 FL (ref 78–100)
MONOCYTES # BLD AUTO: 0.5 10E9/L (ref 0–1.3)
MONOCYTES NFR BLD AUTO: 11.5 %
NEUTROPHILS # BLD AUTO: 2.6 10E9/L (ref 1.6–8.3)
NEUTROPHILS NFR BLD AUTO: 62.8 %
NRBC # BLD AUTO: 0 10*3/UL
NRBC BLD AUTO-RTO: 0 /100
PLATELET # BLD AUTO: 181 10E9/L (ref 150–450)
POTASSIUM SERPL-SCNC: 3.3 MMOL/L (ref 3.4–5.3)
RBC # BLD AUTO: 3.75 10E12/L (ref 3.8–5.2)
SODIUM SERPL-SCNC: 142 MMOL/L (ref 133–144)
WBC # BLD AUTO: 4.1 10E9/L (ref 4–11)

## 2017-06-17 PROCEDURE — 85025 COMPLETE CBC W/AUTO DIFF WBC: CPT | Performed by: INTERNAL MEDICINE

## 2017-06-17 PROCEDURE — 25000132 ZZH RX MED GY IP 250 OP 250 PS 637: Performed by: PHYSICIAN ASSISTANT

## 2017-06-17 PROCEDURE — 99231 SBSQ HOSP IP/OBS SF/LOW 25: CPT | Performed by: INTERNAL MEDICINE

## 2017-06-17 PROCEDURE — 25000125 ZZHC RX 250: Performed by: INTERNAL MEDICINE

## 2017-06-17 PROCEDURE — 80048 BASIC METABOLIC PNL TOTAL CA: CPT | Performed by: INTERNAL MEDICINE

## 2017-06-17 PROCEDURE — 25000132 ZZH RX MED GY IP 250 OP 250 PS 637: Performed by: INTERNAL MEDICINE

## 2017-06-17 PROCEDURE — 36415 COLL VENOUS BLD VENIPUNCTURE: CPT | Performed by: INTERNAL MEDICINE

## 2017-06-17 PROCEDURE — 99239 HOSP IP/OBS DSCHRG MGMT >30: CPT | Performed by: INTERNAL MEDICINE

## 2017-06-17 RX ORDER — AZITHROMYCIN 250 MG/1
250 TABLET, FILM COATED ORAL EVERY 24 HOURS
Qty: 3 TABLET | Refills: 0 | Status: SHIPPED | OUTPATIENT
Start: 2017-06-17 | End: 2017-06-20

## 2017-06-17 RX ADMIN — POTASSIUM CHLORIDE 40 MEQ: 1.5 POWDER, FOR SOLUTION ORAL at 09:58

## 2017-06-17 RX ADMIN — SERTRALINE HYDROCHLORIDE 150 MG: 100 TABLET ORAL at 10:00

## 2017-06-17 RX ADMIN — POTASSIUM CHLORIDE 20 MEQ: 1.5 POWDER, FOR SOLUTION ORAL at 12:44

## 2017-06-17 RX ADMIN — METOPROLOL TARTRATE 12.5 MG: 25 TABLET, FILM COATED ORAL at 10:03

## 2017-06-17 RX ADMIN — DIPHENHYDRAMINE HYDROCHLORIDE 25 MG: 25 CAPSULE ORAL at 09:56

## 2017-06-17 RX ADMIN — LOSARTAN POTASSIUM 25 MG: 25 TABLET ORAL at 10:05

## 2017-06-17 RX ADMIN — ASPIRIN 325 MG ORAL TABLET 325 MG: 325 PILL ORAL at 10:00

## 2017-06-17 RX ADMIN — AMPICILLIN SODIUM AND SULBACTAM SODIUM 3 G: 2; 1 INJECTION, POWDER, FOR SOLUTION INTRAMUSCULAR; INTRAVENOUS at 01:24

## 2017-06-17 RX ADMIN — AZITHROMYCIN 250 MG: 250 TABLET, FILM COATED ORAL at 10:09

## 2017-06-17 RX ADMIN — ACETAMINOPHEN 650 MG: 325 TABLET, FILM COATED ORAL at 03:14

## 2017-06-17 RX ADMIN — AMPICILLIN SODIUM AND SULBACTAM SODIUM 3 G: 2; 1 INJECTION, POWDER, FOR SOLUTION INTRAMUSCULAR; INTRAVENOUS at 05:12

## 2017-06-17 RX ADMIN — DIPHENHYDRAMINE HYDROCHLORIDE 25 MG: 25 CAPSULE ORAL at 13:44

## 2017-06-17 NOTE — PLAN OF CARE
Problem: Pneumonia (Adult)  Goal: Signs and Symptoms of Listed Potential Problems Will be Absent or Manageable (Pneumonia)  Signs and symptoms of listed potential problems will be absent or manageable by discharge/transition of care (reference Pneumonia (Adult) CPG).   Outcome: No Change    06/16/17 2113   Pneumonia   Problems Assessed (Pneumonia) all   Problems Present (Pneumonia) none   [id number]: Up independently, showered, anticipate to D/C on 6/17/17 pending remains afebrile.  Jolly Marquez, RN, BSN  Medical/Telemetry - 3

## 2017-06-17 NOTE — PROVIDER NOTIFICATION
82341 am MD notified of new swelling of lower lip medial and asked to please advise. Patient had medication administered of Unasyn at 0512 am. MD discontinued Unasyn.

## 2017-06-17 NOTE — PLAN OF CARE
Problem: Goal Outcome Summary  Goal: Goal Outcome Summary  Outcome: No Change  Pt. A&O. VSS, Tmax 99.1, denies pain. Transfers independently in room. PRN tylenol administered x1. Tele- SR. Continues Abx- Unasyn and zithromax. Cardiology plan for lexiscan Monday. Plan for d/c 2-3 days.

## 2017-06-17 NOTE — DISCHARGE SUMMARY
DATE OF ADMISSION:   06/14/2017    DATE OF DISCHARGE:  06/17/2017        DISCHARGE DIAGNOSES:   1.  Suspected acute coronary syndrome due to elevated troponin.   2.  Community-acquired pneumonia.   3.  Hypertension.   4.  Hyperlipidemia.      DISPOSITION:  Home.      DISCHARGE MEDICATIONS:   1.  Zithromax 250 mg p.o. daily for 3 more days.   2.  Aspirin 81 mg p.o. daily.   3.  Cozaar 25 mg p.o. daily.   4.  Zoloft 150 mg p.o. daily.   5.  Flonase 2 sprays to both nostrils daily.   6.  Multivitamin 1 tablet p.o. daily.   7.  Fish oil 1,000 mg p.o. daily.   8.  Zocor 20 mg p.o. daily.   9.  Xanax 0.5 mg p.o. 3 times a day.      DISCHARGE CONDITION:  Stable.      DISCHARGE PHYSICAL EXAMINATION:   VITAL SIGNS:  Blood pressure 165/84, pulse rate 86, temperature 98.1, oxygen saturations 96% on room air.   GENERAL:  Awake, alert, oriented, comfortable, in no distress.   HEENT:  Pink, nonicteric, extraocular muscle movement intact.   NECK:  Supple, no jugular venous distension, no thyromegaly.   RESPIRATORY:  Good air entry bilaterally, decreased breath sounds in left lower lobe area.   CARDIOVASCULAR:  S1, S2 heard, no gallop or murmur.   ABDOMEN:  Obese, soft, nontender, nondistended, positive bowel sounds, no organomegaly.   EXTREMITIES:  No edema, cyanosis or clubbing.   NEUROLOGIC:  No focal deficits, cranial nerves grossly intact.     PSYCHIATRIC:  Normal mood and affect, makes eye contact, responds to questions appropriately.      CONSULTATION:  Cardiology.      DISCHARGE LABORATORY STUDIES:     1.  Potassium 3.3, sodium 142, BUN 8, creatinine 0.7, glucose 99.     2.  WBC 4.1, hemoglobin 9.7, platelets 181,000.   3.  Troponin peaked at 0.302, last troponin 0.129.      DISCHARGE DIET:  Regular.      DISCHARGE ACTIVITY:  As tolerated.      DISCHARGE FOLLOW-UP:     1.  Primary Care Physician in 1 week.   2.  Cardiologist in 1 week for outpatient stress test.      BRIEF HISTORY AND HOSPITAL COURSE:  The patient Zulema  JEFFREY Carrillo is a 61-year-old female with a past medical history of hypertension and hyperlipidemia.  She presented to the Emergency Department on 06/14/2017 with malaise, fever and chills, shortness of breath and exertional chest pain which radiated to her jaw and was relieved with nitroglycerin.  She was evaluated and found to have an elevated troponin and was admitted with a diagnosis of acute coronary syndrome.  There was no EKG evidence of ischemia.  The patient had a cough productive of sputum.  CT scan showed a left lower lobe opacity, and she was started on IV antibiotics Zithromax and Unasyn.  It was noted the patient was allergic to fluoroquinolones and cephalosporins.  She was treated with heparin GTT, aspirin, statin, ARB and a beta blocker.  An echocardiogram showed normal left ventricular function, and Cardiology was consulted.        The initial plan was to do a nuclear stress test on Monday.  Today I discussed the situation with Dr. Chan who recommended that we discharge the patient with follow-up as an outpatient and a nuclear stress test versus exercise stress test.  Cardiology will call the patient early next week, and arrangements will be made after she is treated for her community-acquired pneumonia.  As noted above she was treated with Unasyn and Zithromax here, and Unasyn was then stopped.  The patient was given an additional 3 doses of Zithromax, and her blood pressure was controlled.  The patient remained stable and without chest pain.  All her questions and concerns were addressed.  She expressed understanding and was discharged.  Her  and her sisters were at her bedside at the time of discharge, and all their questions and concerns were addressed.  The patient is discharged with follow-up as above.      TOTAL FACE-TO-FACE TIME SPENT WITH PATIENT coordinating her discharge:  More than 40 minutes.         MAURI HUFFMAN MD             D: 06/17/2017 14:06   T: 06/17/2017 15:16   MT:  [id number]      Name:     ALEX CONTRERAS   MRN:      [id number]        Account:        [id number]   :      1955           Admit Date:                                       Discharge Date:       Document: [id number]       cc: Ana Paula Jett MD

## 2017-06-17 NOTE — PROGRESS NOTES
"Cardiology Progress Note  Csaey Chan         Assessment and Plan:      Elevated trops  Decreasing  Trend or presentation not consistent with NSTEMI  Agree with stress nuc. Patient wants to consider doing it as outpt  It is reasonable as she does not have active symptoms and EF normal. I would also like pneumonia to resolve before doing the test. Patient understands the risk of waiting and prefers to go home today.   Prefer exercise stress nuc in 7-10 days followed by SCOTT visit.  On low dose betablocker, hold before the test    Pneumonia  On Abx  Per Hospitalist                      Interval History:   denies chest pain          Review of Systems:   The 5 point Review of Systems is negative other than noted in the HPI          Physical Exam:        Blood pressure 135/84, temperature 98.1  F (36.7  C), temperature source Oral, resp. rate 16, height 1.702 m (5' 7\"), weight 77.7 kg (171 lb 6.4 oz), SpO2 96 %.  Vitals:    06/15/17 0539 06/16/17 0626 06/17/17 0511   Weight: 78.7 kg (173 lb 9.6 oz) 78.1 kg (172 lb 2 oz) 77.7 kg (171 lb 6.4 oz)     Vital Signs with Ranges  Temp:  [98.1  F (36.7  C)-99.1  F (37.3  C)] 98.1  F (36.7  C)  Heart Rate:  [69-78] 69  Resp:  [16-20] 16  BP: ()/(65-84) 135/84  SpO2:  [95 %-97 %] 96 %  I/O's Last 24 hours  I/O last 3 completed shifts:  In: 500 [P.O.:500]  Out: -     EXAM:    Constitutional:   in no apparent distress     Head:   Normocephalic, atramatic     Neck:   no JVD     Lungs:   Rales in left lung       Cardiovascular:   normal S1 and S2 and no murmur noted     Abdomen:   normal bowel sounds and non-tender     Extremities and Back:   No edema     Neurological:   No gross or focal neurologic abnormalities            Medications:          enoxaparin  40 mg Subcutaneous Q24H     simvastatin (ZOCOR) tablet 20 mg [PT SUPPLIED]  20 mg Oral QPM     sodium chloride (PF)  3 mL Intracatheter Q8H     aspirin  325 mg Oral Daily     metoprolol  12.5 mg Oral BID     " azithromycin  250 mg Oral Q24H     losartan (COZAAR) tablet 25 mg  25 mg Oral QAM     sertraline  150 mg Oral QAM            Data:      All new lab and imaging data was reviewed.   Recent Labs   Lab Test  06/17/17   0640  06/14/17   1706   WBC  4.1  6.7   HGB  9.7*  10.8*   MCV  81  79   PLT  181  156      Recent Labs   Lab Test  06/17/17   0640  06/16/17   0647  06/15/17   1206   06/14/17   1706   NA  142   --    --    --   130*   POTASSIUM  3.3*  3.4  3.7   < >  3.0*   CHLORIDE  112*   --    --    --   101   CO2  23   --    --    --   21   BUN  8   --    --    --   14   CR  0.76   --    --    --   0.73   ANIONGAP  7   --    --    --   8   JUNE  8.7   --    --    --   8.8   GLC  99   --    --    --   111*    < > = values in this interval not displayed.     Recent Labs   Lab Test  06/15/17   0631  06/14/17   2140  06/14/17   1754  06/14/17   1706   TROPI  0.129*  0.225*   --   0.302*   TROPONIN   --    --   0.33*   --          EKG results:  Non specific ST changes     Imaging:   No results found for this or any previous visit (from the past 24 hour(s)).

## 2017-06-19 ENCOUNTER — CARE COORDINATION (OUTPATIENT)
Dept: CARDIOLOGY | Facility: CLINIC | Age: 62
End: 2017-06-19

## 2017-06-19 NOTE — PROGRESS NOTES
Called patient and left VM to discuss any post hospital d/c questions she may have, review medication changes, and confirm f/u appts. RN advised patient in  that we would like her to schedule a NM stress test and f/u with an SCOTT within two weeks (once Pneumonia resolved). Patient advised in  to call clinic with any cardiac related questions or concerns and to schedule apts. RN provided phone number for scheduling in .

## 2018-09-26 ENCOUNTER — TRANSFERRED RECORDS (OUTPATIENT)
Dept: HEALTH INFORMATION MANAGEMENT | Facility: CLINIC | Age: 63
End: 2018-09-26

## 2020-10-29 LAB
CHOLEST SERPL-MCNC: 165 MG/DL
CHOLEST/HDLC SERPL: NORMAL {RATIO}
HDLC SERPL-MCNC: 43 MG/DL
LDLC SERPL CALC-MCNC: 109 MG/DL
TRIGL SERPL-MCNC: 66 MG/DL

## 2021-06-25 ENCOUNTER — APPOINTMENT (OUTPATIENT)
Dept: CT IMAGING | Facility: CLINIC | Age: 66
End: 2021-06-25
Attending: EMERGENCY MEDICINE
Payer: COMMERCIAL

## 2021-06-25 ENCOUNTER — HOSPITAL ENCOUNTER (EMERGENCY)
Facility: CLINIC | Age: 66
Discharge: HOME | End: 2021-06-25
Attending: EMERGENCY MEDICINE | Admitting: EMERGENCY MEDICINE
Payer: COMMERCIAL

## 2021-06-25 VITALS
DIASTOLIC BLOOD PRESSURE: 99 MMHG | RESPIRATION RATE: 13 BRPM | HEART RATE: 63 BPM | BODY MASS INDEX: 26.84 KG/M2 | OXYGEN SATURATION: 99 % | WEIGHT: 171 LBS | SYSTOLIC BLOOD PRESSURE: 165 MMHG | TEMPERATURE: 98.9 F | HEIGHT: 67 IN

## 2021-06-25 DIAGNOSIS — R06.09 EXERTIONAL DYSPNEA: ICD-10-CM

## 2021-06-25 LAB
CREAT BLD-MCNC: 0.8 MG/DL (ref 0.52–1.04)
GFR SERPL CREATININE-BSD FRML MDRD: 72 ML/MIN/{1.73_M2}
INTERPRETATION ECG - MUSE: NORMAL
NT-PROBNP SERPL-MCNC: 19 PG/ML (ref 0–900)
TROPONIN I SERPL-MCNC: <0.015 UG/L (ref 0–0.04)

## 2021-06-25 PROCEDURE — 250N000009 HC RX 250: Performed by: EMERGENCY MEDICINE

## 2021-06-25 PROCEDURE — 71275 CT ANGIOGRAPHY CHEST: CPT

## 2021-06-25 PROCEDURE — 250N000011 HC RX IP 250 OP 636: Performed by: EMERGENCY MEDICINE

## 2021-06-25 PROCEDURE — 84484 ASSAY OF TROPONIN QUANT: CPT | Performed by: EMERGENCY MEDICINE

## 2021-06-25 PROCEDURE — 93005 ELECTROCARDIOGRAM TRACING: CPT

## 2021-06-25 PROCEDURE — 83880 ASSAY OF NATRIURETIC PEPTIDE: CPT | Performed by: EMERGENCY MEDICINE

## 2021-06-25 PROCEDURE — 82565 ASSAY OF CREATININE: CPT

## 2021-06-25 PROCEDURE — 99285 EMERGENCY DEPT VISIT HI MDM: CPT | Mod: 25

## 2021-06-25 RX ORDER — IOPAMIDOL 755 MG/ML
66 INJECTION, SOLUTION INTRAVASCULAR ONCE
Status: COMPLETED | OUTPATIENT
Start: 2021-06-25 | End: 2021-06-25

## 2021-06-25 RX ADMIN — SODIUM CHLORIDE 90 ML: 9 INJECTION, SOLUTION INTRAVENOUS at 14:53

## 2021-06-25 RX ADMIN — IOPAMIDOL 66 ML: 755 INJECTION, SOLUTION INTRAVENOUS at 14:52

## 2021-06-25 ASSESSMENT — MIFFLIN-ST. JEOR: SCORE: 1363.28

## 2021-06-25 NOTE — ED TRIAGE NOTES
Pt reports having 1 month of SOB/HERRON. Pt states that she became increasingly SOB today and went to the clinic. UC found pt's d-dimer to be 1.8 and referred her to the ED.

## 2021-06-25 NOTE — ED PROVIDER NOTES
History   Chief Complaint:  Shortness of Breath     The history is provided by the patient.      Zulema Carrillo is a 65 year old female with history of hypertension and hyperlipidemia who presents with shortness of breath. Zulema has had exertional lightheadedness and dyspnea for about a month, but it was the worst yesterday when walking [place]. Her symptoms resolve with rest. She has not had chest pain with this but has some discomfort currently. She went to urgent care for these symptoms were a work up was undertaken, as below, revealing an elevated d-dimer and prompting ER visit. She denies leg swelling. She denies cough, fever, or illness and is vaccinated for COVID-19.     Laboratory Results: 06/25/21  CBC: WBC 3.4 (L), HGB 12.3,   BMP: Glucose 105 H), o/w WNL (Creatinine: 0.80)    D Dimer (Collected 1059): 1.83 (H)    XR Chest 2 Views (06/25/2021 11:11 AM CDT)  2 views obtained. No focal consolidative opacity throughout the bilateral lungs. No pneumothorax, pulmonary edema, or drainable pleural effusion. Bilateral silicone breast implants in place. Heart size normal. No acute fracture.     Review of Systems   Constitutional: Negative for fever.   Respiratory: Positive for shortness of breath (none currently). Negative for cough.    Cardiovascular: Negative for chest pain and leg swelling.   Neurological: Positive for light-headedness (with exertion).   All other systems reviewed and are negative.    Allergies:  Ciprofloxacin  Corn-Related Products  Glucosamine  Hydrocodone-Acetaminophen  Hydromorphone  Ibuprofen  Iodine  Lisinopril  Morphine  Shellfish-Derived Products  Benzonatate  Cephalexin    Medications:  Xanax   Aspirin 81 mg   Losartan   Zoloft   Simvastatin     Past Medical History:    Hypertension   Hyperlipidemia   ACS   MVP    Social History:  Arrives with her daughter and  to the emergency department.   Never smoker    Physical Exam     Patient Vitals for the past 24  "hrs:   BP Temp Temp src Pulse Resp SpO2 Height Weight   06/25/21 1430 -- -- -- 63 13 99 % -- --   06/25/21 1400 -- -- -- 68 16 99 % -- --   06/25/21 1350 (!) 165/99 -- -- 65 13 99 % -- --   06/25/21 1347 (!) 159/98 -- -- 71 13 98 % -- --   06/25/21 1345 -- -- -- 75 30 97 % -- --   06/25/21 1343 (!) 158/89 -- -- 66 14 -- -- --   06/25/21 1336 (!) 171/103 -- -- 67 25 -- -- --   06/25/21 1322 (!) 194/99 98.9  F (37.2  C) Temporal 75 12 99 % 1.702 m (5' 7\") 77.6 kg (171 lb)     Orthostatics 1347  Sitting   /98 Pulse 71  Standing  /99 Pulse 65    Physical Exam  General: Well-developed and well-nourished. Well appearing middle aged  woman. Cooperative.  Head:  Atraumatic.  Eyes:  Conjunctivae, lids, and sclerae are normal.  Neck:  Supple. Normal range of motion.  CV:  Regular rate and rhythm. Normal heart sounds with no murmurs, rubs, or gallops detected.  Resp:  No respiratory distress. Clear to auscultation bilaterally without decreased breath sounds, wheezing, rales, or rhonchi.  GI:  Soft. Non-distended. Non-tender.    MS:  Normal ROM. No bilateral lower extremity edema or calf tenderness.  Skin:  Warm. Non-diaphoretic. No pallor.  Neuro:  Awake. A&Ox3. Normal strength.  Psych: Normal mood and affect. Normal speech.  Vitals reviewed.    Emergency Department Course   EKG  Indication: Shortness of Breath  Time: 1342  Rate 63 bpm. OR interval 180. QRS duration 80. QT/QTc 396/405.   Normal sinus rhythm   Nonspecific ST and T wave abnormality   Abnormal ECG  No acute ST changes.  No change as compared to prior, dated 6/14/17.    Imaging:  CT Chest Pulmonary Embolism w Contrast  Final Result  IMPRESSION:  No pulmonary embolism demonstrated.  KAYLA GREWAL MD    Laboratory:  Creatinine POCT: Creatinine 0.8, GFR estimate 72    Troponin (Collected 1352): <0.015    Nt probnp inpatient (BNP): 19     Emergency Department Course:    Reviewed:  I reviewed nursing notes, vitals, past medical history, and Care " Everywhere.    Assessments:  1334 I obtained history and examined the patient as noted above.   1602 I rechecked the patient and she was comfortable with discharge.     Disposition:  The patient was discharged home.     Impression & Plan   Medical Decision Making:  Zulema is a 65-year-old woman who has had several weeks of which she describes as exertional dyspnea.  It was particularly bad yesterday when she was trying to walk [place] so she presented to urgent care today.  There laboratory studies were obtained which were reassuring aside from the elevated D-dimer at 1.83.  She also had an unremarkable chest x-ray.  She was referred to the emergency department due to her elevated D-dimer.  Fortunately, she appears quite well on exam.  She has no symptoms at rest.  She has some chest discomfort just today but none with exertion.  EKG here is reassuring without acute ST changes or arrhythmias and troponin is undetectable.  She does not look volume overloaded and BNP is normal.  I did perform CT angiogram of the chest to rule out pulmonary embolism which was negative.  We obtained orthostatics which were also negative.  At this point, it is unclear what is causing her exertional dyspnea and lightheadedness.  I reviewed an admission in 2017 where there was concern for ACS although cardiology did not feel that this was the case but rather attributed her elevated troponin to a pneumonia. She has not seen cardiology since that admission.  At this point I will start with an outpatient echocardiogram.  She may need a stress test if the echocardiogram is normal and she continues to have symptoms.  I have placed a referral for cardiology so she can discuss the results of her echocardiogram and the next steps.  However, I encouraged her to avoid strenuous activity as it is causing her symptoms and we also discussed a very low threshold to return if she is having changing or worsening of her symptoms or development of  new concerns.  All questions answered.  Amenable to discharge.    Covid-19  Zulema Carrillo was evaluated during a global COVID-19 pandemic, which necessitated consideration that the patient might be at risk for infection with the SARS-CoV-2 virus that causes COVID-19.   Applicable protocols for evaluation were followed during the patient's care.       Diagnosis:    ICD-10-CM    1. Exertional dyspnea  R06.00 CARDIOLOGY EVAL ADULT REFERRAL     Echocardiogram Complete       Scribe Disclosure:  I, Blake Brown and Anup Martinez, am serving as a scribe at 1:33 PM on 6/25/2021 to document services personally performed by Rachael Armas MD based on my observations and the provider's statements to me.              Rachael Armas MD  06/26/21 1212

## 2021-06-25 NOTE — DISCHARGE INSTRUCTIONS
We will start with an outpatient echocardiogram.  If this is normal, and you continue to have symptoms you may also need a stress test.    I have placed a referral for cardiology so you can discuss your results and next steps.  Avoid strenuous activity if it is causing any symptoms.  You should return immediately if you have significant worsening or change in symptoms including, but not limited to, chest pain, fainting, or shortness of breath or lightheadedness that does not resolve with rest.

## 2021-06-26 ASSESSMENT — ENCOUNTER SYMPTOMS
COUGH: 0
FEVER: 0
SHORTNESS OF BREATH: 1
LIGHT-HEADEDNESS: 1

## 2021-06-28 ENCOUNTER — HOSPITAL ENCOUNTER (OUTPATIENT)
Dept: CARDIOLOGY | Facility: CLINIC | Age: 66
Discharge: HOME | End: 2021-06-28
Attending: EMERGENCY MEDICINE | Admitting: EMERGENCY MEDICINE
Payer: COMMERCIAL

## 2021-06-28 DIAGNOSIS — R06.09 EXERTIONAL DYSPNEA: ICD-10-CM

## 2021-06-28 PROCEDURE — 255N000002 HC RX 255 OP 636: Performed by: EMERGENCY MEDICINE

## 2021-06-28 PROCEDURE — 93306 TTE W/DOPPLER COMPLETE: CPT | Mod: 26 | Performed by: INTERNAL MEDICINE

## 2021-06-28 PROCEDURE — 999N000208 ECHOCARDIOGRAM COMPLETE

## 2021-06-28 RX ADMIN — HUMAN ALBUMIN MICROSPHERES AND PERFLUTREN 9 ML: 10; .22 INJECTION, SOLUTION INTRAVENOUS at 13:37

## 2021-06-29 ENCOUNTER — PRE VISIT (OUTPATIENT)
Dept: CARDIOLOGY | Facility: CLINIC | Age: 66
End: 2021-06-29

## 2021-07-02 ENCOUNTER — OFFICE VISIT (OUTPATIENT)
Dept: CARDIOLOGY | Facility: CLINIC | Age: 66
End: 2021-07-02
Payer: COMMERCIAL

## 2021-07-02 VITALS
SYSTOLIC BLOOD PRESSURE: 136 MMHG | HEART RATE: 78 BPM | BODY MASS INDEX: 28.09 KG/M2 | HEIGHT: 67 IN | WEIGHT: 179 LBS | OXYGEN SATURATION: 97 % | DIASTOLIC BLOOD PRESSURE: 78 MMHG

## 2021-07-02 DIAGNOSIS — I10 ESSENTIAL HYPERTENSION: ICD-10-CM

## 2021-07-02 DIAGNOSIS — R06.09 DOE (DYSPNEA ON EXERTION): Primary | ICD-10-CM

## 2021-07-02 DIAGNOSIS — E78.00 PURE HYPERCHOLESTEROLEMIA: ICD-10-CM

## 2021-07-02 PROCEDURE — 99204 OFFICE O/P NEW MOD 45 MIN: CPT | Performed by: INTERNAL MEDICINE

## 2021-07-02 RX ORDER — ALBUTEROL SULFATE 90 UG/1
2 AEROSOL, METERED RESPIRATORY (INHALATION) EVERY 6 HOURS PRN
COMMUNITY

## 2021-07-02 RX ORDER — PRAVASTATIN SODIUM 40 MG
40 TABLET ORAL DAILY
COMMUNITY
End: 2021-07-22 | Stop reason: ALTCHOICE

## 2021-07-02 RX ORDER — METHYLDOPA/HYDROCHLOROTHIAZIDE 250MG-15MG
1 TABLET ORAL DAILY PRN
COMMUNITY
End: 2023-11-06

## 2021-07-02 RX ORDER — CETIRIZINE HYDROCHLORIDE 10 MG/1
10 TABLET ORAL PRN
Status: ON HOLD | COMMUNITY
End: 2022-10-03

## 2021-07-02 ASSESSMENT — MIFFLIN-ST. JEOR: SCORE: 1399.57

## 2021-07-02 NOTE — LETTER
7/2/2021    GERSON TORRES [facility] [address] [place] [address]    RE: Zulema Carrillo       Dear Colleague,    I had the pleasure of seeing Zulema Carrillo in the [facility].    HPI and Plan:   See dictation    No orders of the defined types were placed in this encounter.      Orders Placed This Encounter   Medications     Menatetrenone (VITAMIN K2) 100 MCG TABS     fluticasone-salmeterol (ADVAIR) 100-50 MCG/DOSE inhaler     Sig: Inhale 1 puff into the lungs every 12 hours     albuterol (PROAIR HFA/PROVENTIL HFA/VENTOLIN HFA) 108 (90 Base) MCG/ACT inhaler     Sig: Inhale 2 puffs into the lungs every 6 hours     Pharmacy may dispense brand covered by insurance (Proair, or proventil or ventolin or generic albuterol inhaler)     cetirizine (ZYRTEC ALLERGY) 10 MG tablet     Sig: Take 10 mg by mouth as needed for allergies     pravastatin (PRAVACHOL) 40 MG tablet     Sig: Take 40 mg by mouth daily       There are no discontinued medications.      Encounter Diagnoses   Name Primary?     HERRON (dyspnea on exertion) Yes     Pure hypercholesterolemia      Essential hypertension        CURRENT MEDICATIONS:  Current Outpatient Medications   Medication Sig Dispense Refill     albuterol (PROAIR HFA/PROVENTIL HFA/VENTOLIN HFA) 108 (90 Base) MCG/ACT inhaler Inhale 2 puffs into the lungs every 6 hours       ALPRAZolam (XANAX PO) Take 0.5 mg by mouth 3 times daily as needed for anxiety       cetirizine (ZYRTEC ALLERGY) 10 MG tablet Take 10 mg by mouth as needed for allergies       fluticasone (FLONASE) 50 MCG/ACT spray Spray 2 sprays into both nostrils daily       fluticasone-salmeterol (ADVAIR) 100-50 MCG/DOSE inhaler Inhale 1 puff into the lungs every 12 hours       Losartan Potassium (COZAAR PO) Take 25 mg by mouth every morning        Menatetrenone (VITAMIN K2) 100 MCG TABS        pravastatin (PRAVACHOL) 40 MG tablet Take 40 mg by mouth daily        sertraline (ZOLOFT) 100 MG tablet Take 150 mg by mouth every morning       aspirin 81 MG EC tablet Take 1 tablet (81 mg) by mouth daily (Patient not taking: Reported on 7/2/2021) 30 tablet 0     multivitamin, therapeutic with minerals (THERA-VIT-M) TABS tablet Take 1 tablet by mouth daily       omega 3 1000 MG CAPS Take 1,000 mg by mouth every morning       Simvastatin (ZOCOR PO) Take 20 mg by mouth every evening Patient has corn allergy, needs corn free medication. Patient uses Aurob .       VITAMIN D, CHOLECALCIFEROL, PO Take 2,000 Units by mouth every morning         ALLERGIES     Allergies   Allergen Reactions     Ciprofloxacin      Corn-Related Products Other (See Comments)     Congested and agitated     Glucosamine      Hydrocodone-Acetaminophen Nausea     Hydromorphone      Ibuprofen      Iodine      Lisinopril      Morphine Hives     Shellfish-Derived Products      Benzonatate Anxiety     Cephalexin Rash       PAST MEDICAL HISTORY:  Past Medical History:   Diagnosis Date     HLD (hyperlipidemia)      HTN (hypertension)      MVP (mitral valve prolapse)        PAST SURGICAL HISTORY:  No past surgical history on file.    FAMILY HISTORY:  No family history on file.    SOCIAL HISTORY:  Social History     Socioeconomic History     Marital status:      Spouse name: Not on file     Number of children: Not on file     Years of education: Not on file     Highest education level: Not on file   Occupational History     Not on file   Social Needs     Financial resource strain: Not on file     Food insecurity     Worry: Not on file     Inability: Not on file     Transportation needs     Medical: Not on file     Non-medical: Not on file   Tobacco Use     Smoking status: Not on file   Substance and Sexual Activity     Alcohol use: Not on file     Drug use: Not on file     Sexual activity: Not on file   Lifestyle     Physical activity     Days per week: Not on file     Minutes per session: Not on file  "    Stress: Not on file   Relationships     Social connections     Talks on phone: Not on file     Gets together: Not on file     Attends Zoroastrian service: Not on file     Active member of club or organization: Not on file     Attends meetings of clubs or organizations: Not on file     Relationship status: Not on file     Intimate partner violence     Fear of current or ex partner: Not on file     Emotionally abused: Not on file     Physically abused: Not on file     Forced sexual activity: Not on file   Other Topics Concern     Not on file   Social History Narrative     Not on file       Review of Systems:  Skin:  not assessed       Eyes:  not assessed      ENT:  not assessed      Respiratory:  Positive for shortness of breath;dyspnea on exertion;sleep apnea;CPAP     Cardiovascular:    fatigue;chest pain;Positive for;lightheadedness;dizziness last chest pain on tuesday  Gastroenterology: not assessed      Genitourinary:  not assessed      Musculoskeletal:  not assessed      Neurologic:  not assessed      Psychiatric:  not assessed      Heme/Lymph/Imm:  not assessed      Endocrine:  Negative        Physical Exam:  Vitals: /78 (BP Location: Right arm, Patient Position: Sitting, Cuff Size: Adult Regular)   Pulse 78   Ht 1.702 m (5' 7\")   Wt 81.2 kg (179 lb)   SpO2 97%   BMI 28.04 kg/m      Constitutional:  cooperative, alert and oriented, well developed, well nourished, in no acute distress overweight      Skin:  warm and dry to the touch, no apparent skin lesions or masses noted          Head:  normocephalic, no masses or lesions        Eyes:  pupils equal and round, conjunctivae and lids unremarkable, sclera white, no xanthalasma, EOMS intact, no nystagmus        Lymph:      ENT:  no pallor or cyanosis, dentition good        Neck:  carotid pulses are full and equal bilaterally;no carotid bruit;JVP normal        Respiratory:  normal breath sounds, clear to auscultation, normal A-P diameter, normal " symmetry, normal respiratory excursion, no use of accessory muscles         Cardiac: regular rhythm;no murmurs, gallops or rubs detected                pulses full and equal                                        GI:           Extremities and Muscular Skeletal:  no edema;no spinal abnormalities noted;normal muscle strength and tone              Neurological:  no gross motor deficits        Psych:           CC  Rachael Armas MD  [facility]  [address]  [place]  [place] [address]                Service Date: 07/02/2021    CLINIC VISIT    HISTORY OF PRESENT ILLNESS:  Zulema is a very nice 65-year-old woman whose family history significant for mother having coronary bypass grafting in her 70s.  Zulema is a lifelong nonsmoker, does not have diabetes mellitus.  She does have hyperlipidemia, on medications and does not have hypertension.    She states, when down in [place], she will walk 4 miles every day without any symptoms.  Upon return in March, she immediately noted that she would get quite short of breath with activity and exercise.  Then, ultimately, on the 28th, sought medical attention in the emergency room for her dyspnea on exertion.  She states she does have some vague chest discomfort, maybe some chest tightness with this.  She initially was seen in urgent care but then referred on to the emergency room.  There, EKG and chest x-ray were normal but she did have an elevated D-dimer.  CT angiogram of the chest was negative for pulmonary embolus.  Troponins were less than 0.015.  N-terminal proBNP was 19.  She was set up for an outpatient echocardiogram and recommended that she consider outpatient stress testing.    Echocardiogram was performed on the 28th and this demonstrated ejection fraction of 60%-65% without focal wall motion abnormalities.  Of note, she also in the past was told she had mitral valve prolapse but mitral valve was now described as normal with only trace mitral regurgitation.   Pulmonary pressures could not be estimated, as tricuspid regurgitation was only trace.  She did have a normal IVC.    Zulema returns to clinic and states she still has the decreased exercise tolerance.    Review of Care Everywhere shows a [facility] fasting lipid profile from October that shows total cholesterol of 165, HDL of 43, LDL of 109 and triglycerides of 66, on pravastatin 40.    She also has a list of 20 different drug allergies but appears that she has only been on pravastatin.  Of note, she is also on aspirin.    ASSESSMENT AND PLAN:  I suspect Zulema's dyspnea on exertion is not cardiac in origin, although she is quite concerned given her family history.  Her daughter is a nurse practitioner and after discussing matters, we set up a stress nuclear scan.  She does have bilateral implants from bilateral mastectomy which may affect imaging quality.  Nonetheless, we will be able to see what her exercise capacity is, EKG, ST segments and whether she develops any symptoms.    Depending on results, we may be headed towards coronary angiogram which I doubt.  More likely, we will be headed for a calcium score to figure out how high risk she actually is and how aggressive we need to be with her cholesterol profile.    As stated, cholesterol profile above.  This is adequate, as she does not have any significant underlying coronary disease but obviously, if she is high risk based on her calcium score or an abnormal stress test, I will get much more aggressive and aim to get her LDL, at minimum, below 100 but ideally below 70.    She also talks about dizziness but clearly this is vertigo-type thing and she also describes having Barany maneuvers to alleviate this and I have reassured her that this has nothing to do with her heart.    She is also concerned that any pulmonary process may be affecting her heart and I reassured her that the echocardiogram does not show that any of that is going on at this  time.    Further evaluation and treatment will depend upon the above results.    Anam Rollins MD, [facility]        D: 2021   T: 2021   MT: anne-marie    Name:     ALEX CONTRERAS  MRN:      [id number]        Account:      [id number]   :      1955           Service Date: 2021       Document: [id number]        Thank you for allowing me to participate in the care of your patient.      Sincerely,     Anam Rollins MD     [facility]  cc:   Rachael Armas MD  [facility]  [address] [place] [address]

## 2021-07-02 NOTE — PROGRESS NOTES
Service Date: 07/02/2021    CLINIC VISIT    HISTORY OF PRESENT ILLNESS:  Zulema is a very nice 65-year-old woman whose family history significant for mother having coronary bypass grafting in her 70s.  Zulema is a lifelong nonsmoker, does not have diabetes mellitus.  She does have hyperlipidemia, on medications and does not have hypertension.    She states, when down in [place], she will walk 4 miles every day without any symptoms.  Upon return in March, she immediately noted that she would get quite short of breath with activity and exercise.  Then, ultimately, on the 28th, sought medical attention in the emergency room for her dyspnea on exertion.  She states she does have some vague chest discomfort, maybe some chest tightness with this.  She initially was seen in urgent care but then referred on to the emergency room.  There, EKG and chest x-ray were normal but she did have an elevated D-dimer.  CT angiogram of the chest was negative for pulmonary embolus.  Troponins were less than 0.015.  N-terminal proBNP was 19.  She was set up for an outpatient echocardiogram and recommended that she consider outpatient stress testing.    Echocardiogram was performed on the 28th and this demonstrated ejection fraction of 60%-65% without focal wall motion abnormalities.  Of note, she also in the past was told she had mitral valve prolapse but mitral valve was now described as normal with only trace mitral regurgitation.  Pulmonary pressures could not be estimated, as tricuspid regurgitation was only trace.  She did have a normal IVC.    Zulema returns to clinic and states she still has the decreased exercise tolerance.    Review of Care Everywhere shows a [facility] fasting lipid profile from October that shows total cholesterol of 165, HDL of 43, LDL of 109 and triglycerides of 66, on pravastatin 40.    She also has a list of 20 different drug allergies but appears that she has only been on pravastatin.  Of note, she  is also on aspirin.    ASSESSMENT AND PLAN:  I suspect Alex's dyspnea on exertion is not cardiac in origin, although she is quite concerned given her family history.  Her daughter is a nurse practitioner and after discussing matters, we set up a stress nuclear scan.  She does have bilateral implants from bilateral mastectomy which may affect imaging quality.  Nonetheless, we will be able to see what her exercise capacity is, EKG, ST segments and whether she develops any symptoms.    Depending on results, we may be headed towards coronary angiogram which I doubt.  More likely, we will be headed for a calcium score to figure out how high risk she actually is and how aggressive we need to be with her cholesterol profile.    As stated, cholesterol profile above.  This is adequate, as she does not have any significant underlying coronary disease but obviously, if she is high risk based on her calcium score or an abnormal stress test, I will get much more aggressive and aim to get her LDL, at minimum, below 100 but ideally below 70.    She also talks about dizziness but clearly this is vertigo-type thing and she also describes having Barany maneuvers to alleviate this and I have reassured her that this has nothing to do with her heart.    She is also concerned that any pulmonary process may be affecting her heart and I reassured her that the echocardiogram does not show that any of that is going on at this time.    Further evaluation and treatment will depend upon the above results.    Anam Rollins MD, [facility]        D: 2021   T: 2021   MT: anne-marie    Name:     ALEX CONTRERAS  MRN:      [id number]        Account:      [id number]   :      1955           Service Date: 2021       Document: [id number]

## 2021-07-02 NOTE — PROGRESS NOTES
HPI and Plan:   See dictation    No orders of the defined types were placed in this encounter.      Orders Placed This Encounter   Medications     Menatetrenone (VITAMIN K2) 100 MCG TABS     fluticasone-salmeterol (ADVAIR) 100-50 MCG/DOSE inhaler     Sig: Inhale 1 puff into the lungs every 12 hours     albuterol (PROAIR HFA/PROVENTIL HFA/VENTOLIN HFA) 108 (90 Base) MCG/ACT inhaler     Sig: Inhale 2 puffs into the lungs every 6 hours     Pharmacy may dispense brand covered by insurance (Proair, or proventil or ventolin or generic albuterol inhaler)     cetirizine (ZYRTEC ALLERGY) 10 MG tablet     Sig: Take 10 mg by mouth as needed for allergies     pravastatin (PRAVACHOL) 40 MG tablet     Sig: Take 40 mg by mouth daily       There are no discontinued medications.      Encounter Diagnoses   Name Primary?     HERRON (dyspnea on exertion) Yes     Pure hypercholesterolemia      Essential hypertension        CURRENT MEDICATIONS:  Current Outpatient Medications   Medication Sig Dispense Refill     albuterol (PROAIR HFA/PROVENTIL HFA/VENTOLIN HFA) 108 (90 Base) MCG/ACT inhaler Inhale 2 puffs into the lungs every 6 hours       ALPRAZolam (XANAX PO) Take 0.5 mg by mouth 3 times daily as needed for anxiety       cetirizine (ZYRTEC ALLERGY) 10 MG tablet Take 10 mg by mouth as needed for allergies       fluticasone (FLONASE) 50 MCG/ACT spray Spray 2 sprays into both nostrils daily       fluticasone-salmeterol (ADVAIR) 100-50 MCG/DOSE inhaler Inhale 1 puff into the lungs every 12 hours       Losartan Potassium (COZAAR PO) Take 25 mg by mouth every morning        Menatetrenone (VITAMIN K2) 100 MCG TABS        pravastatin (PRAVACHOL) 40 MG tablet Take 40 mg by mouth daily       sertraline (ZOLOFT) 100 MG tablet Take 150 mg by mouth every morning       aspirin 81 MG EC tablet Take 1 tablet (81 mg) by mouth daily (Patient not taking: Reported on 7/2/2021) 30 tablet 0     multivitamin, therapeutic with minerals (THERA-VIT-M) TABS tablet  Take 1 tablet by mouth daily       omega 3 1000 MG CAPS Take 1,000 mg by mouth every morning       Simvastatin (ZOCOR PO) Take 20 mg by mouth every evening Patient has corn allergy, needs corn free medication. Patient uses Aurob .       VITAMIN D, CHOLECALCIFEROL, PO Take 2,000 Units by mouth every morning         ALLERGIES     Allergies   Allergen Reactions     Ciprofloxacin      Corn-Related Products Other (See Comments)     Congested and agitated     Glucosamine      Hydrocodone-Acetaminophen Nausea     Hydromorphone      Ibuprofen      Iodine      Lisinopril      Morphine Hives     Shellfish-Derived Products      Benzonatate Anxiety     Cephalexin Rash       PAST MEDICAL HISTORY:  Past Medical History:   Diagnosis Date     HLD (hyperlipidemia)      HTN (hypertension)      MVP (mitral valve prolapse)        PAST SURGICAL HISTORY:  No past surgical history on file.    FAMILY HISTORY:  No family history on file.    SOCIAL HISTORY:  Social History     Socioeconomic History     Marital status:      Spouse name: Not on file     Number of children: Not on file     Years of education: Not on file     Highest education level: Not on file   Occupational History     Not on file   Social Needs     Financial resource strain: Not on file     Food insecurity     Worry: Not on file     Inability: Not on file     Transportation needs     Medical: Not on file     Non-medical: Not on file   Tobacco Use     Smoking status: Not on file   Substance and Sexual Activity     Alcohol use: Not on file     Drug use: Not on file     Sexual activity: Not on file   Lifestyle     Physical activity     Days per week: Not on file     Minutes per session: Not on file     Stress: Not on file   Relationships     Social connections     Talks on phone: Not on file     Gets together: Not on file     Attends Anabaptism service: Not on file     Active member of club or organization: Not on file     Attends meetings of clubs or  "organizations: Not on file     Relationship status: Not on file     Intimate partner violence     Fear of current or ex partner: Not on file     Emotionally abused: Not on file     Physically abused: Not on file     Forced sexual activity: Not on file   Other Topics Concern     Not on file   Social History Narrative     Not on file       Review of Systems:  Skin:  not assessed       Eyes:  not assessed      ENT:  not assessed      Respiratory:  Positive for shortness of breath;dyspnea on exertion;sleep apnea;CPAP     Cardiovascular:    fatigue;chest pain;Positive for;lightheadedness;dizziness last chest pain on tuesday  Gastroenterology: not assessed      Genitourinary:  not assessed      Musculoskeletal:  not assessed      Neurologic:  not assessed      Psychiatric:  not assessed      Heme/Lymph/Imm:  not assessed      Endocrine:  Negative        Physical Exam:  Vitals: /78 (BP Location: Right arm, Patient Position: Sitting, Cuff Size: Adult Regular)   Pulse 78   Ht 1.702 m (5' 7\")   Wt 81.2 kg (179 lb)   SpO2 97%   BMI 28.04 kg/m      Constitutional:  cooperative, alert and oriented, well developed, well nourished, in no acute distress overweight      Skin:  warm and dry to the touch, no apparent skin lesions or masses noted          Head:  normocephalic, no masses or lesions        Eyes:  pupils equal and round, conjunctivae and lids unremarkable, sclera white, no xanthalasma, EOMS intact, no nystagmus        Lymph:      ENT:  no pallor or cyanosis, dentition good        Neck:  carotid pulses are full and equal bilaterally;no carotid bruit;JVP normal        Respiratory:  normal breath sounds, clear to auscultation, normal A-P diameter, normal symmetry, normal respiratory excursion, no use of accessory muscles         Cardiac: regular rhythm;no murmurs, gallops or rubs detected                pulses full and equal                                        GI:           Extremities and Muscular Skeletal:  " no edema;no spinal abnormalities noted;normal muscle strength and tone              Neurological:  no gross motor deficits        Psych:           CC  Rachael Armas MD  [facility]  [address] [facility]  [place] [address]

## 2021-07-12 ENCOUNTER — HOSPITAL ENCOUNTER (OUTPATIENT)
Dept: CARDIOLOGY | Facility: CLINIC | Age: 66
Setting detail: NUCLEAR MEDICINE
End: 2021-07-12
Attending: INTERNAL MEDICINE
Payer: COMMERCIAL

## 2021-07-12 ENCOUNTER — TELEPHONE (OUTPATIENT)
Dept: CARDIOLOGY | Facility: CLINIC | Age: 66
End: 2021-07-12

## 2021-07-12 ENCOUNTER — HOSPITAL ENCOUNTER (OUTPATIENT)
Dept: CARDIOLOGY | Facility: CLINIC | Age: 66
End: 2021-07-12
Attending: INTERNAL MEDICINE
Payer: COMMERCIAL

## 2021-07-12 VITALS
OXYGEN SATURATION: 98 % | SYSTOLIC BLOOD PRESSURE: 140 MMHG | BODY MASS INDEX: 27.78 KG/M2 | DIASTOLIC BLOOD PRESSURE: 76 MMHG | WEIGHT: 177 LBS | HEIGHT: 67 IN | HEART RATE: 74 BPM

## 2021-07-12 DIAGNOSIS — R06.09 DOE (DYSPNEA ON EXERTION): Primary | ICD-10-CM

## 2021-07-12 DIAGNOSIS — R06.09 DOE (DYSPNEA ON EXERTION): ICD-10-CM

## 2021-07-12 LAB
CV BLOOD PRESSURE: 68 MMHG
CV STRESS MAX HR HE: 133
NUC STRESS EJECTION FRACTION: 68 %
RATE PRESSURE PRODUCT: NORMAL
STRESS ANGINA INDEX: 0
STRESS DUKE TREADMILL SCORE: 2
STRESS ECHO BASELINE DIASTOLIC HE: 78
STRESS ECHO BASELINE HR: 81
STRESS ECHO BASELINE SYSTOLIC BP: 130
STRESS ECHO CALCULATED PERCENT HR: 85 %
STRESS ECHO LAST STRESS DIASTOLIC BP: 76
STRESS ECHO LAST STRESS SYSTOLIC BP: 160
STRESS ECHO POST ESTIMATED WORKLOAD: 10 METS
STRESS ECHO POST EXERCISE DUR MIN: 9 MIN
STRESS ECHO POST EXERCISE DUR SEC: 0 SEC
STRESS ECHO TARGET HR: 157
STRESS ST DEPRESSION: 1.5 MM
STRESS/REST PERFUSION RATIO: 1.03

## 2021-07-12 PROCEDURE — 78452 HT MUSCLE IMAGE SPECT MULT: CPT | Mod: 26 | Performed by: INTERNAL MEDICINE

## 2021-07-12 PROCEDURE — 93016 CV STRESS TEST SUPVJ ONLY: CPT | Performed by: INTERNAL MEDICINE

## 2021-07-12 PROCEDURE — 78452 HT MUSCLE IMAGE SPECT MULT: CPT

## 2021-07-12 PROCEDURE — 93018 CV STRESS TEST I&R ONLY: CPT | Performed by: INTERNAL MEDICINE

## 2021-07-12 PROCEDURE — A9502 TC99M TETROFOSMIN: HCPCS | Performed by: INTERNAL MEDICINE

## 2021-07-12 PROCEDURE — 343N000001 HC RX 343: Performed by: INTERNAL MEDICINE

## 2021-07-12 RX ADMIN — TETROFOSMIN 3.32 MCI.: 1.38 INJECTION, POWDER, LYOPHILIZED, FOR SOLUTION INTRAVENOUS at 08:41

## 2021-07-12 RX ADMIN — TETROFOSMIN 9.26 MCI.: 1.38 INJECTION, POWDER, LYOPHILIZED, FOR SOLUTION INTRAVENOUS at 10:48

## 2021-07-12 ASSESSMENT — MIFFLIN-ST. JEOR: SCORE: 1390.5

## 2021-07-12 NOTE — TELEPHONE ENCOUNTER
Nuclear study 7/12/2021 noted. Ordered for c/o decreased endurance. Per Dr. Rollins's dictation on 7/2/2021 - will consider ordering CT calcium score depending on results    Nuclear study:     Myocardial perfusion imaging using single isotope technique demonstrated no significant myocardial perfusion defects consistent with myocardial ischemia or infarction.     Left ventricular function is normal.     The left ventricular ejection fraction at rest is 68%.  The left ventricular ejection fraction at stress is 68%.     LV cavity size normal.     Stress to rest cavity ratio is 1.03.  TID is absent.     The patient's exercise capacity is above average.     The stress electrocardiogram was abnormal.  There is 1.5 mm of up sloping ST segment depression in the II, III and aVF leads, and 1mm horizontal ST segment depression in leads V4-6.     There is no prior study for comparison.      Will message Dr. Rollins to review

## 2021-07-13 NOTE — TELEPHONE ENCOUNTER
Order placed for CT calcium score. Attempted to contact patient to review nuclear study results and Dr. Rollins's recommendation for next test. Left a message on patient's cell phone. landline is an incorrect number.      [id number] spoke with patient to review nuclear study results as stable. She would like to proceed with plan for CT calcium score. Patient is aware of out of pocket cost of $105. Transferred patient to scheduling to set up appointment.    Patient's WUA=[id number] at physical in October.

## 2021-07-21 ENCOUNTER — HOSPITAL ENCOUNTER (OUTPATIENT)
Dept: CARDIOLOGY | Facility: CLINIC | Age: 66
Discharge: HOME | End: 2021-07-21
Attending: INTERNAL MEDICINE | Admitting: INTERNAL MEDICINE
Payer: COMMERCIAL

## 2021-07-21 ENCOUNTER — TELEPHONE (OUTPATIENT)
Dept: CARDIOLOGY | Facility: CLINIC | Age: 66
End: 2021-07-21

## 2021-07-21 DIAGNOSIS — E78.5 HYPERLIPIDEMIA: Primary | ICD-10-CM

## 2021-07-21 DIAGNOSIS — R06.09 DOE (DYSPNEA ON EXERTION): ICD-10-CM

## 2021-07-21 PROCEDURE — 75571 CT HRT W/O DYE W/CA TEST: CPT | Mod: GA

## 2021-07-21 PROCEDURE — 75571 CT HRT W/O DYE W/CA TEST: CPT | Mod: 26 | Performed by: INTERNAL MEDICINE

## 2021-07-21 NOTE — TELEPHONE ENCOUNTER
CT Ca+ score 7-21-21  Left main coronary artery: 0  Left anterior descending coronary artery: 10.3  Circumflex coronary artery: 0   Right coronary artery: 0  TOTAL CALCIUM SCORE: 10.3  - The total Agatston calcium score is 10.3 placing the patient in the  50-75th percentile when compared to age and gender matched control  Group.  SOFT TISSUE RESULTS:  .  No significant noncardiac/nonvascular finding.  Last Dr. Rollins's visit 7-2-21 for Dyspnea on exertion -   I suspect Zulema's dyspnea on exertion is not cardiac in origin, although she is quite concerned given her family history.      Nuclear done 7-12-21. Ca+ score was recommended.     Dr. Rollins to review

## 2021-07-21 NOTE — TELEPHONE ENCOUNTER
Ca score puts her at the 72  percentile. I would start rosuvastatin 10mgs/day. Recheck a flp/alt in one month.

## 2021-07-22 RX ORDER — ROSUVASTATIN CALCIUM 10 MG/1
10 TABLET, COATED ORAL DAILY
Qty: 30 TABLET | Refills: 3 | Status: SHIPPED | OUTPATIENT
Start: 2021-07-22 | End: 2021-08-31 | Stop reason: DRUGHIGH

## 2021-07-22 NOTE — TELEPHONE ENCOUNTER
Called patient to discuss CT calcium results as showing 50-75 percentile with single artery involvement. Reviewed Dr. Rollins's recommendation to change to rosuvastatin 10mg daily from pravastatin 40mg barry.  Patient agreed with plan.  Rx escripted for rosuvastatin. Patient notes that she has a severe corn allergy and will have to review the ingredients with her pharmacist first.  Order placed for flp/alt in 1 month.    Mailed a copy of CT calcium score to patient per request.  Link sent for My Chart so she can set up an account.

## 2021-07-22 NOTE — TELEPHONE ENCOUNTER
Patient called to ask if she can exercise. Reviewed with patient that she does not have restrictions other than to decrease level of exertion if any cardiac symptoms. Patient notes she is working on her diet as well.

## 2021-07-26 ENCOUNTER — TELEPHONE (OUTPATIENT)
Dept: CARDIOLOGY | Facility: CLINIC | Age: 66
End: 2021-07-26

## 2021-07-26 NOTE — TELEPHONE ENCOUNTER
Spoke with patient to review her test results again. She thought her LAD was 72% blocked. Reviewed that this is her percentile of risk, not a blockage score. Patient states she feels much better after reviewing her care again.

## 2021-08-26 ENCOUNTER — LAB (OUTPATIENT)
Dept: LAB | Facility: CLINIC | Age: 66
End: 2021-08-26
Payer: COMMERCIAL

## 2021-08-26 DIAGNOSIS — E78.5 HYPERLIPIDEMIA: ICD-10-CM

## 2021-08-26 LAB
ALT SERPL W P-5'-P-CCNC: 40 U/L (ref 0–50)
CHOLEST SERPL-MCNC: 143 MG/DL
FASTING STATUS PATIENT QL REPORTED: YES
HDLC SERPL-MCNC: 49 MG/DL
LDLC SERPL CALC-MCNC: 82 MG/DL
NONHDLC SERPL-MCNC: 94 MG/DL
TRIGL SERPL-MCNC: 59 MG/DL

## 2021-08-26 PROCEDURE — 84460 ALANINE AMINO (ALT) (SGPT): CPT

## 2021-08-26 PROCEDURE — 36415 COLL VENOUS BLD VENIPUNCTURE: CPT

## 2021-08-26 PROCEDURE — 80061 LIPID PANEL: CPT

## 2021-08-27 ENCOUNTER — TELEPHONE (OUTPATIENT)
Dept: CARDIOLOGY | Facility: CLINIC | Age: 66
End: 2021-08-27

## 2021-08-27 DIAGNOSIS — E78.00 PURE HYPERCHOLESTEROLEMIA: Primary | ICD-10-CM

## 2021-08-27 NOTE — TELEPHONE ENCOUNTER
FLP/ALT completed today, about 1mo after changing from pravastatin to rosuvastatin 10mg for elevated CT calcium score. Routed to Dr Rollins for review.

## 2021-08-30 NOTE — TELEPHONE ENCOUNTER
Much improved. LDL down from 109. How does she feel? We can stay where we are for meds and continue with lifestyle or try increased rosuvastatin. If increase meds recheck FLP/ALT in one month, otherwise F/U in one year with SCOTT.

## 2021-08-30 NOTE — TELEPHONE ENCOUNTER
Attempted to contact patient to discuss lipid panel and to check symptoms of possible side effects as well as review Dr. Rollins's recommendations. Left message for patient to call back.      1030 spoke with patient, she would like to try increasing the crestor as she struggles with her allergies and is not exercising much right now. She tried the whole 30 diet and is having some trouble tolerating foods she used to eat.  Rx escripted for crestor 20mg daily. Med list updated  Patient will check flp/alt in 4 weeks. Order placed for lab work.

## 2021-08-31 RX ORDER — ROSUVASTATIN CALCIUM 20 MG/1
20 TABLET, COATED ORAL DAILY
Qty: 30 TABLET | Refills: 4 | Status: SHIPPED | OUTPATIENT
Start: 2021-08-31 | End: 2021-12-31

## 2021-09-04 ENCOUNTER — HEALTH MAINTENANCE LETTER (OUTPATIENT)
Age: 66
End: 2021-09-04

## 2021-12-13 ENCOUNTER — LAB (OUTPATIENT)
Dept: LAB | Facility: CLINIC | Age: 66
End: 2021-12-13
Payer: COMMERCIAL

## 2021-12-13 ENCOUNTER — TELEPHONE (OUTPATIENT)
Dept: CARDIOLOGY | Facility: CLINIC | Age: 66
End: 2021-12-13

## 2021-12-13 DIAGNOSIS — I10 ESSENTIAL HYPERTENSION: Primary | ICD-10-CM

## 2021-12-13 DIAGNOSIS — E78.00 PURE HYPERCHOLESTEROLEMIA: ICD-10-CM

## 2021-12-13 DIAGNOSIS — E78.5 HYPERLIPIDEMIA: ICD-10-CM

## 2021-12-13 LAB
ALT SERPL W P-5'-P-CCNC: 25 U/L (ref 0–50)
CHOLEST SERPL-MCNC: 135 MG/DL
FASTING STATUS PATIENT QL REPORTED: YES
HDLC SERPL-MCNC: 52 MG/DL
LDLC SERPL CALC-MCNC: 67 MG/DL
NONHDLC SERPL-MCNC: 83 MG/DL
TRIGL SERPL-MCNC: 81 MG/DL

## 2021-12-13 PROCEDURE — 84460 ALANINE AMINO (ALT) (SGPT): CPT | Performed by: INTERNAL MEDICINE

## 2021-12-13 PROCEDURE — 36415 COLL VENOUS BLD VENIPUNCTURE: CPT | Performed by: INTERNAL MEDICINE

## 2021-12-13 PROCEDURE — 80061 LIPID PANEL: CPT | Performed by: INTERNAL MEDICINE

## 2021-12-13 NOTE — TELEPHONE ENCOUNTER
Fasting lipid profile is much improved.  HDL is up LDL is down.  Continue current meds.  Also continue regular exercise, weight loss and predominantly plant-based diet.

## 2021-12-13 NOTE — TELEPHONE ENCOUNTER
Lipid panel 12/13/2021 noted. Ordered for 1 month follow up after increasing crestor from 10mg to 20mg daily.    LDL improved from 82 to 67    Component      Latest Ref Rng & Units 12/13/2021   Cholesterol      <200 mg/dL 135   Triglycerides      <150 mg/dL 81   HDL Cholesterol      >=50 mg/dL 52   LDL Cholesterol Calculated      <=100 mg/dL 67   Non HDL Cholesterol      <130 mg/dL 83   Patient Fasting > 8hrs?       Yes   ALT      0 - 50 U/L 25       Patient has a history of hyperlipidemia and family history of CAD. CT calcium score 7/2021:  The total Agatston calcium score is 10.3 placing the patient in the 50-75th percentile   Patient to return 8/2022 for SCOTT visit with labs.    Will message Dr. Rollins to review

## 2021-12-14 NOTE — TELEPHONE ENCOUNTER
Attempted to contact patient to review lipid panel and Dr. Rollins's review and recommendations. Left a message with the update and sent a my chart message with update.

## 2021-12-31 DIAGNOSIS — E78.00 PURE HYPERCHOLESTEROLEMIA: ICD-10-CM

## 2021-12-31 RX ORDER — ROSUVASTATIN CALCIUM 20 MG/1
20 TABLET, COATED ORAL DAILY
Qty: 90 TABLET | Refills: 1 | Status: SHIPPED | OUTPATIENT
Start: 2021-12-31 | End: 2022-06-22

## 2022-06-22 DIAGNOSIS — E78.00 PURE HYPERCHOLESTEROLEMIA: ICD-10-CM

## 2022-06-22 RX ORDER — ROSUVASTATIN CALCIUM 20 MG/1
20 TABLET, COATED ORAL DAILY
Qty: 90 TABLET | Refills: 0 | Status: SHIPPED | OUTPATIENT
Start: 2022-06-22 | End: 2022-09-28

## 2022-06-22 NOTE — TELEPHONE ENCOUNTER
[facility] Cardiology Refill Guideline reviewed.  Medication meets criteria for refill.   Shannon Rosario RN on 6/22/2022 at 1:37 PM

## 2022-07-13 ENCOUNTER — TELEPHONE (OUTPATIENT)
Dept: CARDIOLOGY | Facility: CLINIC | Age: 67
End: 2022-07-13

## 2022-07-13 NOTE — TELEPHONE ENCOUNTER
[facility] Call Center    Phone Message    May a detailed message be left on voicemail: yes     Reason for Call: Symptoms or Concerns     If patient has red-flag symptoms, warm transfer to triage line    Current symptom or concern: Possible medication reaction: Patient has been getting painful leg cramps, increase in urination and increase in blood sugar noticed from previous lab work.    A1C  2/2020: 5.7  2021: 5.9    Patient is wondering if there is another medication to try.    Medication:   rosuvastatin (CRESTOR) 20 MG tablet    Pharmacy:  [id number] - [place], [place] - [address]    Symptoms have been present for:   Daily but increasing over time    Has patient previously been seen for this? Not seen for leg cramping    Dr Rollins    Are there any new or worsening symptoms? Worsening      Action Taken: Other: Caardiology    Travel Screening: Not Applicable

## 2022-07-13 NOTE — TELEPHONE ENCOUNTER
Spoke to patient and reviewed message from Dr Rollins. Pt verbalized understanding, will follow up with PCP regarding the leg pain. Cardiology follow up scheduled for August.

## 2022-07-13 NOTE — TELEPHONE ENCOUNTER
Spoke to patient, says her leg cramps started last fall and occurred in the middle of the night but now they are daily. She feels a cramping pain in her left calf only and it is tender to the touch. Pain quality/location has been the same since last fall. She drinks ample amounts of water and does not think she is dehydrated. She notes her A1c has gone up as well and wonders if this could be from the rosuvastatin as she read it was a side effect.     Pt is due for annual follow up, recommended she schedule this now. Will route message to Dr Rollins in the meantime.

## 2022-07-13 NOTE — TELEPHONE ENCOUNTER
No.  Unilateral cramping is probably not rosuvastatin.  Rosuvastatin causes total body myalgias like the flu.  Not cramps.

## 2022-09-26 ENCOUNTER — LAB (OUTPATIENT)
Dept: LAB | Facility: CLINIC | Age: 67
End: 2022-09-26
Payer: COMMERCIAL

## 2022-09-26 DIAGNOSIS — E78.5 HYPERLIPIDEMIA: ICD-10-CM

## 2022-09-26 DIAGNOSIS — I10 ESSENTIAL HYPERTENSION: ICD-10-CM

## 2022-09-26 LAB
ANION GAP SERPL CALCULATED.3IONS-SCNC: 7 MMOL/L (ref 7–15)
BUN SERPL-MCNC: 13.1 MG/DL (ref 8–23)
CALCIUM SERPL-MCNC: 9.4 MG/DL (ref 8.8–10.2)
CHLORIDE SERPL-SCNC: 108 MMOL/L (ref 98–107)
CHOLEST SERPL-MCNC: 133 MG/DL
CREAT SERPL-MCNC: 0.76 MG/DL (ref 0.51–0.95)
DEPRECATED HCO3 PLAS-SCNC: 25 MMOL/L (ref 22–29)
GFR SERPL CREATININE-BSD FRML MDRD: 86 ML/MIN/1.73M2
GLUCOSE SERPL-MCNC: 107 MG/DL (ref 70–99)
HDLC SERPL-MCNC: 48 MG/DL
LDLC SERPL CALC-MCNC: 73 MG/DL
NONHDLC SERPL-MCNC: 85 MG/DL
POTASSIUM SERPL-SCNC: 3.6 MMOL/L (ref 3.4–5.3)
SODIUM SERPL-SCNC: 140 MMOL/L (ref 136–145)
TRIGL SERPL-MCNC: 59 MG/DL

## 2022-09-26 PROCEDURE — 36415 COLL VENOUS BLD VENIPUNCTURE: CPT | Performed by: INTERNAL MEDICINE

## 2022-09-26 PROCEDURE — 80061 LIPID PANEL: CPT | Performed by: INTERNAL MEDICINE

## 2022-09-26 PROCEDURE — 80048 BASIC METABOLIC PNL TOTAL CA: CPT | Performed by: INTERNAL MEDICINE

## 2022-09-28 DIAGNOSIS — E78.00 PURE HYPERCHOLESTEROLEMIA: ICD-10-CM

## 2022-09-28 RX ORDER — ROSUVASTATIN CALCIUM 20 MG/1
20 TABLET, COATED ORAL DAILY
Qty: 90 TABLET | Refills: 0 | Status: SHIPPED | OUTPATIENT
Start: 2022-09-28 | End: 2022-10-26

## 2022-09-28 NOTE — TELEPHONE ENCOUNTER
crestor refill. [facility] Cardiology Refill Guideline reviewed. Patient to see SCOTT Pauline Saravia on 10/26/2022.

## 2022-09-29 RX ORDER — EPINEPHRINE 0.3 MG/.3ML
0.3 INJECTION SUBCUTANEOUS PRN
COMMUNITY

## 2022-10-03 ENCOUNTER — HOSPITAL ENCOUNTER (OUTPATIENT)
Facility: CLINIC | Age: 67
Discharge: HOME | End: 2022-10-03
Attending: PLASTIC SURGERY | Admitting: PLASTIC SURGERY
Payer: COMMERCIAL

## 2022-10-03 ENCOUNTER — ANESTHESIA EVENT (OUTPATIENT)
Dept: SURGERY | Facility: CLINIC | Age: 67
End: 2022-10-03
Payer: COMMERCIAL

## 2022-10-03 ENCOUNTER — ANESTHESIA (OUTPATIENT)
Dept: SURGERY | Facility: CLINIC | Age: 67
End: 2022-10-03
Payer: COMMERCIAL

## 2022-10-03 VITALS
BODY MASS INDEX: 26.67 KG/M2 | TEMPERATURE: 97.5 F | SYSTOLIC BLOOD PRESSURE: 121 MMHG | WEIGHT: 169.9 LBS | HEIGHT: 67 IN | DIASTOLIC BLOOD PRESSURE: 72 MMHG | RESPIRATION RATE: 16 BRPM | OXYGEN SATURATION: 96 % | HEART RATE: 72 BPM

## 2022-10-03 PROCEDURE — 370N000017 HC ANESTHESIA TECHNICAL FEE, PER MIN: Performed by: PLASTIC SURGERY

## 2022-10-03 PROCEDURE — 250N000009 HC RX 250: Performed by: PLASTIC SURGERY

## 2022-10-03 PROCEDURE — 710N000012 HC RECOVERY PHASE 2, PER MINUTE: Performed by: PLASTIC SURGERY

## 2022-10-03 PROCEDURE — 88300 SURGICAL PATH GROSS: CPT | Mod: TC | Performed by: PLASTIC SURGERY

## 2022-10-03 PROCEDURE — 258N000003 HC RX IP 258 OP 636: Performed by: NURSE ANESTHETIST, CERTIFIED REGISTERED

## 2022-10-03 PROCEDURE — 250N000009 HC RX 250: Performed by: NURSE ANESTHETIST, CERTIFIED REGISTERED

## 2022-10-03 PROCEDURE — 250N000011 HC RX IP 250 OP 636: Performed by: NURSE ANESTHETIST, CERTIFIED REGISTERED

## 2022-10-03 PROCEDURE — 250N000011 HC RX IP 250 OP 636: Performed by: PLASTIC SURGERY

## 2022-10-03 PROCEDURE — 258N000003 HC RX IP 258 OP 636: Performed by: ANESTHESIOLOGY

## 2022-10-03 PROCEDURE — 710N000009 HC RECOVERY PHASE 1, LEVEL 1, PER MIN: Performed by: PLASTIC SURGERY

## 2022-10-03 PROCEDURE — 360N000076 HC SURGERY LEVEL 3, PER MIN: Performed by: PLASTIC SURGERY

## 2022-10-03 PROCEDURE — 250N000012 HC RX MED GY IP 250 OP 636 PS 637: Performed by: ANESTHESIOLOGY

## 2022-10-03 PROCEDURE — 272N000001 HC OR GENERAL SUPPLY STERILE: Performed by: PLASTIC SURGERY

## 2022-10-03 PROCEDURE — 999N000141 HC STATISTIC PRE-PROCEDURE NURSING ASSESSMENT: Performed by: PLASTIC SURGERY

## 2022-10-03 PROCEDURE — 250N000011 HC RX IP 250 OP 636: Performed by: PHYSICIAN ASSISTANT

## 2022-10-03 RX ORDER — APREPITANT 40 MG/1
40 CAPSULE ORAL DAILY
Status: DISCONTINUED | OUTPATIENT
Start: 2022-10-03 | End: 2022-10-03 | Stop reason: HOSPADM

## 2022-10-03 RX ORDER — ONDANSETRON 4 MG/1
4 TABLET, ORALLY DISINTEGRATING ORAL EVERY 30 MIN PRN
Status: DISCONTINUED | OUTPATIENT
Start: 2022-10-03 | End: 2022-10-03 | Stop reason: HOSPADM

## 2022-10-03 RX ORDER — LABETALOL HYDROCHLORIDE 5 MG/ML
10 INJECTION, SOLUTION INTRAVENOUS
Status: DISCONTINUED | OUTPATIENT
Start: 2022-10-03 | End: 2022-10-03 | Stop reason: HOSPADM

## 2022-10-03 RX ORDER — CLINDAMYCIN PHOSPHATE 900 MG/50ML
INJECTION, SOLUTION INTRAVENOUS
Status: DISCONTINUED
Start: 2022-10-03 | End: 2022-10-03 | Stop reason: HOSPADM

## 2022-10-03 RX ORDER — ONDANSETRON 4 MG/1
4 TABLET, ORALLY DISINTEGRATING ORAL
Status: DISCONTINUED | OUTPATIENT
Start: 2022-10-03 | End: 2022-10-03 | Stop reason: HOSPADM

## 2022-10-03 RX ORDER — ACETAMINOPHEN 500 MG
1000 TABLET ORAL ONCE
Status: DISCONTINUED | OUTPATIENT
Start: 2022-10-03 | End: 2022-10-03

## 2022-10-03 RX ORDER — DEXAMETHASONE SODIUM PHOSPHATE 4 MG/ML
INJECTION, SOLUTION INTRA-ARTICULAR; INTRALESIONAL; INTRAMUSCULAR; INTRAVENOUS; SOFT TISSUE PRN
Status: DISCONTINUED | OUTPATIENT
Start: 2022-10-03 | End: 2022-10-03

## 2022-10-03 RX ORDER — CEFAZOLIN SODIUM/WATER 2 G/20 ML
2 SYRINGE (ML) INTRAVENOUS SEE ADMIN INSTRUCTIONS
Status: DISCONTINUED | OUTPATIENT
Start: 2022-10-03 | End: 2022-10-03 | Stop reason: HOSPADM

## 2022-10-03 RX ORDER — HYDROMORPHONE HCL IN WATER/PF 6 MG/30 ML
0.4 PATIENT CONTROLLED ANALGESIA SYRINGE INTRAVENOUS EVERY 5 MIN PRN
Status: DISCONTINUED | OUTPATIENT
Start: 2022-10-03 | End: 2022-10-03 | Stop reason: HOSPADM

## 2022-10-03 RX ORDER — ONDANSETRON 2 MG/ML
4 INJECTION INTRAMUSCULAR; INTRAVENOUS EVERY 30 MIN PRN
Status: DISCONTINUED | OUTPATIENT
Start: 2022-10-03 | End: 2022-10-03 | Stop reason: HOSPADM

## 2022-10-03 RX ORDER — SODIUM CHLORIDE 9 MG/ML
INJECTION, SOLUTION INTRAVENOUS CONTINUOUS PRN
Status: DISCONTINUED | OUTPATIENT
Start: 2022-10-03 | End: 2022-10-03

## 2022-10-03 RX ORDER — SODIUM CHLORIDE, SODIUM LACTATE, POTASSIUM CHLORIDE, CALCIUM CHLORIDE 600; 310; 30; 20 MG/100ML; MG/100ML; MG/100ML; MG/100ML
INJECTION, SOLUTION INTRAVENOUS CONTINUOUS
Status: DISCONTINUED | OUTPATIENT
Start: 2022-10-03 | End: 2022-10-03 | Stop reason: HOSPADM

## 2022-10-03 RX ORDER — PROPOFOL 10 MG/ML
INJECTION, EMULSION INTRAVENOUS PRN
Status: DISCONTINUED | OUTPATIENT
Start: 2022-10-03 | End: 2022-10-03

## 2022-10-03 RX ORDER — OXYCODONE HCL 5 MG/5 ML
5 SOLUTION, ORAL ORAL EVERY 4 HOURS PRN
Status: DISCONTINUED | OUTPATIENT
Start: 2022-10-03 | End: 2022-10-03 | Stop reason: HOSPADM

## 2022-10-03 RX ORDER — FAMOTIDINE 10 MG
10 TABLET ORAL 2 TIMES DAILY PRN
COMMUNITY
End: 2023-11-06

## 2022-10-03 RX ORDER — CEFAZOLIN SODIUM/WATER 2 G/20 ML
2 SYRINGE (ML) INTRAVENOUS
Status: DISCONTINUED | OUTPATIENT
Start: 2022-10-03 | End: 2022-10-03 | Stop reason: HOSPADM

## 2022-10-03 RX ORDER — HYDRALAZINE HYDROCHLORIDE 20 MG/ML
2.5-5 INJECTION INTRAMUSCULAR; INTRAVENOUS EVERY 10 MIN PRN
Status: DISCONTINUED | OUTPATIENT
Start: 2022-10-03 | End: 2022-10-03 | Stop reason: HOSPADM

## 2022-10-03 RX ORDER — PROPOFOL 10 MG/ML
INJECTION, EMULSION INTRAVENOUS CONTINUOUS PRN
Status: DISCONTINUED | OUTPATIENT
Start: 2022-10-03 | End: 2022-10-03

## 2022-10-03 RX ORDER — ONDANSETRON 2 MG/ML
INJECTION INTRAMUSCULAR; INTRAVENOUS PRN
Status: DISCONTINUED | OUTPATIENT
Start: 2022-10-03 | End: 2022-10-03

## 2022-10-03 RX ORDER — FENTANYL CITRATE 50 UG/ML
INJECTION, SOLUTION INTRAMUSCULAR; INTRAVENOUS PRN
Status: DISCONTINUED | OUTPATIENT
Start: 2022-10-03 | End: 2022-10-03

## 2022-10-03 RX ORDER — GABAPENTIN 300 MG/1
300 CAPSULE ORAL ONCE
Status: DISCONTINUED | OUTPATIENT
Start: 2022-10-03 | End: 2022-10-03 | Stop reason: HOSPADM

## 2022-10-03 RX ORDER — MAGNESIUM HYDROXIDE 1200 MG/15ML
LIQUID ORAL PRN
Status: DISCONTINUED | OUTPATIENT
Start: 2022-10-03 | End: 2022-10-03 | Stop reason: HOSPADM

## 2022-10-03 RX ORDER — OXYCODONE HYDROCHLORIDE 5 MG/1
5 TABLET ORAL EVERY 4 HOURS PRN
Status: DISCONTINUED | OUTPATIENT
Start: 2022-10-03 | End: 2022-10-03 | Stop reason: HOSPADM

## 2022-10-03 RX ORDER — MEPERIDINE HYDROCHLORIDE 25 MG/ML
12.5 INJECTION INTRAMUSCULAR; INTRAVENOUS; SUBCUTANEOUS EVERY 5 MIN PRN
Status: DISCONTINUED | OUTPATIENT
Start: 2022-10-03 | End: 2022-10-03 | Stop reason: HOSPADM

## 2022-10-03 RX ORDER — ALBUTEROL SULFATE 0.83 MG/ML
2.5 SOLUTION RESPIRATORY (INHALATION) EVERY 4 HOURS PRN
Status: DISCONTINUED | OUTPATIENT
Start: 2022-10-03 | End: 2022-10-03 | Stop reason: HOSPADM

## 2022-10-03 RX ORDER — ACETAMINOPHEN 160 MG
TABLET,DISINTEGRATING ORAL PRN
Status: DISCONTINUED | OUTPATIENT
Start: 2022-10-03 | End: 2022-10-03 | Stop reason: HOSPADM

## 2022-10-03 RX ORDER — EPHEDRINE SULFATE 50 MG/ML
INJECTION, SOLUTION INTRAMUSCULAR; INTRAVENOUS; SUBCUTANEOUS PRN
Status: DISCONTINUED | OUTPATIENT
Start: 2022-10-03 | End: 2022-10-03

## 2022-10-03 RX ORDER — TRIAMCINOLONE ACETONIDE 55 UG/1
2 SPRAY, METERED NASAL DAILY PRN
COMMUNITY

## 2022-10-03 RX ORDER — OXYCODONE HCL 10 MG/1
10 TABLET, FILM COATED, EXTENDED RELEASE ORAL EVERY 12 HOURS
Status: DISCONTINUED | OUTPATIENT
Start: 2022-10-03 | End: 2022-10-03 | Stop reason: HOSPADM

## 2022-10-03 RX ORDER — LIDOCAINE HYDROCHLORIDE 20 MG/ML
INJECTION, SOLUTION INFILTRATION; PERINEURAL PRN
Status: DISCONTINUED | OUTPATIENT
Start: 2022-10-03 | End: 2022-10-03

## 2022-10-03 RX ORDER — DEXMEDETOMIDINE HYDROCHLORIDE 4 UG/ML
INJECTION, SOLUTION INTRAVENOUS PRN
Status: DISCONTINUED | OUTPATIENT
Start: 2022-10-03 | End: 2022-10-03

## 2022-10-03 RX ORDER — FENTANYL CITRATE 50 UG/ML
50 INJECTION, SOLUTION INTRAMUSCULAR; INTRAVENOUS EVERY 5 MIN PRN
Status: DISCONTINUED | OUTPATIENT
Start: 2022-10-03 | End: 2022-10-03 | Stop reason: HOSPADM

## 2022-10-03 RX ADMIN — Medication 10 MG: at 12:12

## 2022-10-03 RX ADMIN — LIDOCAINE HYDROCHLORIDE 40 MG: 20 INJECTION, SOLUTION INFILTRATION; PERINEURAL at 10:40

## 2022-10-03 RX ADMIN — APREPITANT 40 MG: 40 CAPSULE ORAL at 09:22

## 2022-10-03 RX ADMIN — DEXMEDETOMIDINE HYDROCHLORIDE 4 MCG: 200 INJECTION INTRAVENOUS at 10:19

## 2022-10-03 RX ADMIN — Medication 10 MG: at 10:58

## 2022-10-03 RX ADMIN — SODIUM CHLORIDE: 9 INJECTION, SOLUTION INTRAVENOUS at 10:33

## 2022-10-03 RX ADMIN — DEXMEDETOMIDINE HYDROCHLORIDE 12 MCG: 200 INJECTION INTRAVENOUS at 11:00

## 2022-10-03 RX ADMIN — FENTANYL CITRATE 50 MCG: 50 INJECTION, SOLUTION INTRAMUSCULAR; INTRAVENOUS at 10:40

## 2022-10-03 RX ADMIN — PROPOFOL 40 MG: 10 INJECTION, EMULSION INTRAVENOUS at 11:44

## 2022-10-03 RX ADMIN — FENTANYL CITRATE 50 MCG: 50 INJECTION, SOLUTION INTRAMUSCULAR; INTRAVENOUS at 13:10

## 2022-10-03 RX ADMIN — FENTANYL CITRATE 50 MCG: 50 INJECTION, SOLUTION INTRAMUSCULAR; INTRAVENOUS at 10:56

## 2022-10-03 RX ADMIN — MIDAZOLAM HYDROCHLORIDE 0.5 MG: 1 INJECTION, SOLUTION INTRAMUSCULAR; INTRAVENOUS at 13:34

## 2022-10-03 RX ADMIN — FENTANYL CITRATE 50 MCG: 50 INJECTION, SOLUTION INTRAMUSCULAR; INTRAVENOUS at 10:54

## 2022-10-03 RX ADMIN — SODIUM CHLORIDE, POTASSIUM CHLORIDE, SODIUM LACTATE AND CALCIUM CHLORIDE: 600; 310; 30; 20 INJECTION, SOLUTION INTRAVENOUS at 09:24

## 2022-10-03 RX ADMIN — ONDANSETRON 4 MG: 2 INJECTION INTRAMUSCULAR; INTRAVENOUS at 13:55

## 2022-10-03 RX ADMIN — SODIUM CHLORIDE: 9 INJECTION, SOLUTION INTRAVENOUS at 12:25

## 2022-10-03 RX ADMIN — DEXMEDETOMIDINE HYDROCHLORIDE 8 MCG: 200 INJECTION INTRAVENOUS at 11:53

## 2022-10-03 RX ADMIN — DEXAMETHASONE SODIUM PHOSPHATE 8 MG: 4 INJECTION, SOLUTION INTRA-ARTICULAR; INTRALESIONAL; INTRAMUSCULAR; INTRAVENOUS; SOFT TISSUE at 11:30

## 2022-10-03 RX ADMIN — Medication 2 G: at 10:33

## 2022-10-03 RX ADMIN — MIDAZOLAM HYDROCHLORIDE 0.5 MG: 1 INJECTION, SOLUTION INTRAMUSCULAR; INTRAVENOUS at 12:53

## 2022-10-03 RX ADMIN — PROPOFOL 200 MCG/KG/MIN: 10 INJECTION, EMULSION INTRAVENOUS at 10:40

## 2022-10-03 RX ADMIN — FENTANYL CITRATE 50 MCG: 50 INJECTION, SOLUTION INTRAMUSCULAR; INTRAVENOUS at 10:45

## 2022-10-03 RX ADMIN — MIDAZOLAM HYDROCHLORIDE 2 MG: 1 INJECTION, SOLUTION INTRAMUSCULAR; INTRAVENOUS at 10:33

## 2022-10-03 RX ADMIN — ONDANSETRON 4 MG: 2 INJECTION INTRAMUSCULAR; INTRAVENOUS at 14:08

## 2022-10-03 RX ADMIN — DEXMEDETOMIDINE HYDROCHLORIDE 8 MCG: 200 INJECTION INTRAVENOUS at 12:34

## 2022-10-03 RX ADMIN — DEXMEDETOMIDINE HYDROCHLORIDE 8 MCG: 200 INJECTION INTRAVENOUS at 11:24

## 2022-10-03 RX ADMIN — FENTANYL CITRATE 50 MCG: 50 INJECTION, SOLUTION INTRAMUSCULAR; INTRAVENOUS at 14:05

## 2022-10-03 RX ADMIN — PROPOFOL 40 MG: 10 INJECTION, EMULSION INTRAVENOUS at 11:53

## 2022-10-03 RX ADMIN — PROPOFOL 200 MG: 10 INJECTION, EMULSION INTRAVENOUS at 10:40

## 2022-10-03 ASSESSMENT — ACTIVITIES OF DAILY LIVING (ADL)
ADLS_ACUITY_SCORE: 37

## 2022-10-03 NOTE — DISCHARGE INSTRUCTIONS
Same Day Surgery Discharge Instructions for  Sedation and General Anesthesia     It's not unusual to feel dizzy, light-headed or faint for up to 24 hours after surgery or while taking pain medication.  If you have these symptoms: sit for a few minutes before standing and have someone assist you when you get up to walk or use the bathroom.    You should rest and relax for the next 24 hours. We recommend you make arrangements to have an adult stay with you for at least 24 hours after your discharge.  Avoid hazardous and strenuous activity.    DO NOT DRIVE any vehicle or operate mechanical equipment for 24 hours following the end of your surgery.  Even though you may feel normal, your reactions may be affected by the medication you have received.    Do not drink alcoholic beverages for 24 hours following surgery.     Slowly progress to your regular diet as you feel able. It's not unusual to feel nauseated and/or vomit after receiving anesthesia.  If you develop these symptoms, drink clear liquids (apple juice, ginger ale, broth, 7-up, etc. ) until you feel better.  If your nausea and vomiting persists for 24 hours, please notify your surgeon.      All narcotic pain medications, along with inactivity and anesthesia, can cause constipation. Drinking plenty of liquids and increasing fiber intake will help.    For any questions of a medical nature, call your surgeon.    Do not make important decisions for 24 hours.    If you had general anesthesia, you may have a sore throat for a couple of days related to the breathing tube used during surgery.  You may use Cepacol lozenges to help with this discomfort.  If it worsens or if you develop a fever, contact your surgeon.     If you feel your pain is not well managed with the pain medications prescribed by your surgeon, please contact your surgeon's office to let them know so they can address your concerns.      **If you have questions or concerns about your procedure, call    Dr. Pearl at [phone].**      Lb Foss Drain  Home Care Instructions    What is a Lb Foss (PENNY) Drain?  This is a small tube that connects to a bulb.  Its gentle suction removes extra fluid from a surgical wound.  Your doctor will remove the tube when the amount of fluid decreases.  The color and amount of fluid varies.  Right after surgery the fluid is bright red.  Over time, it changes to light pink and may become clear or the color of straw.    How should I care for my tube site?  Keep the skin around the tube dry.  Check with your doctor about how to shower.  You may need to cover the site with plastic when you shower.  Or, it may be okay to let the site get wet and put on a clean bandage after you shower.    If the bandage gets wet, you will need to change it.  How should I care for the bulb?  Keep the bulb compressed at all times except while you empty it.   Attach the bulb to your clothing with tape and a safety pin.  Try to empty the bulb at the same time every day.  Empty the bulb at least once a day, or when the bulb becomes half full.  If it becomes too full, there will not be enough suction.    To empty the bulb:  Wash your hands.  Open the bulb cap.  Drain the fluid from the bulb into the measuring cup.  If you have two drains, use two cups.    Clean the mouth of the bulb with an alcohol wipe if your nurse told you to.  Squeeze the bulb (fold it in half before you close the bulb cap) If it does not stay compressed, call your nurse or clinic.  Write the amount of drainage on the drainage record (see back page).  If you have two drains, write the amount for each bulb.  Flush the drainage down the toilet.  Rinse the measuring cup.  Wash your hands.    When should I call my doctor?   Call your doctor if:  You have a fever over 101 F (38.3 C), taken under the tongue.   The drainage increases or smells bad.  The skin around your tube has increased redness, swelling, warmth or pain.  You have  pus or fluid leaking at the tube site.  Your stitches break.  You think the tube is not draining.  The tube falls out.  You have any problems or concerns.    Your drainage record:    Empty your bulb at least once per day or when 1/2 to 1/3 full.  Write down the date, time and amount of drainage in each bulb.   Bring this record to each clinic visit.    Date Time Bulb 1: amount of  Drainage in (ml or cc) Bulb 2: amount of drainage (in ml or cc) Notes

## 2022-10-03 NOTE — ANESTHESIA PROCEDURE NOTES
Airway       Patient location during procedure: OR  Staff -        CRNA: Kristi Dong APRN CRNA       Performed By: CRNA  Consent for Airway        Urgency: elective  Indications and Patient Condition       Indications for airway management: celina-procedural         Mask difficulty assessment: 1 - vent by mask    Final Airway Details       Final airway type: supraglottic airway    Supraglottic Airway Details        Type: LMA       Brand: Ambu AuraGain       LMA size: 4       Airway Seal Pressure (cm H2O): 20    Post intubation assessment        Placement verified by: capnometry, equal breath sounds and chest rise        Number of attempts at approach: 1       Secured with: pink tape       Ease of procedure: easy       Dentition: Intact and Unchanged

## 2022-10-03 NOTE — ANESTHESIA PREPROCEDURE EVALUATION
Anesthesia Pre-Procedure Evaluation    Patient: Zulema Carrillo   MRN: [id number] : 1955        Procedure : Procedure(s):  BILATERAL BREAST IMPLANT REMOVAL AND TOTAL PERIPROSTHETIC CAPSULECTOMIES, BILATERAL CHEST WALL REVISIONS          Past Medical History:   Diagnosis Date     Anxiety      CAD (coronary artery disease)      Depression      Gastroesophageal reflux disease with esophagitis      HLD (hyperlipidemia)      HTN (hypertension)      Melanoma of skin (H)      Migraine      MVP (mitral valve prolapse)      CHELSEA (obstructive sleep apnea)     CPAP     Osteoarthritis      PONV (postoperative nausea and vomiting)      Reactive airway disease      Sjogren's syndrome (H)       Past Surgical History:   Procedure Laterality Date     AS TOTAL KNEE ARTHROPLASTY       BREAST SURGERY       HYSTERECTOMY       MASTECTOMY       MENISCECTOMY       SALPINGO-OOPHORECTOMY BILATERAL        Allergies   Allergen Reactions     Aspirin Hives     Corn-Related Products Other (See Comments)     Congested and agitated     Glucosamine Itching     Hydrocodone-Acetaminophen Nausea     Ibuprofen Hives     Iodine Itching     Redness     Lisinopril Cough     Morphine Hives     Shellfish-Derived Products Hives     Benzonatate Anxiety     Cephalexin Rash     Ciprofloxacin Rash     Hydromorphone Rash     itching      Social History     Tobacco Use     Smoking status: Not on file     Smokeless tobacco: Never Used   Substance Use Topics     Alcohol use: Yes     Comment: OCCASIONAL      Wt Readings from Last 1 Encounters:   10/03/22 77.1 kg (169 lb 14.4 oz)        Anesthesia Evaluation   Pt has had prior anesthetic.     History of anesthetic complications  - PONV.      ROS/MED HX  ENT/Pulmonary:     (+) sleep apnea, asthma     Neurologic:     (+) migraines,     Cardiovascular:     (+) Dyslipidemia hypertension--CAD ---    METS/Exercise Tolerance:     Hematologic:       Musculoskeletal:       GI/Hepatic:     (+) GERD,      Renal/Genitourinary:       Endo:       Psychiatric/Substance Use:     (+) psychiatric history anxiety and depression     Infectious Disease:       Malignancy:       Other: Comment: Sjogren's Syndrome           Physical Exam    Airway        Mallampati: II   TM distance: > 3 FB   Neck ROM: full   Mouth opening: > 3 cm    Respiratory Devices and Support         Dental  no notable dental history         Cardiovascular   cardiovascular exam normal          Pulmonary   pulmonary exam normal                OUTSIDE LABS:  CBC:   Lab Results   Component Value Date    WBC 4.1 06/17/2017    WBC 6.7 06/14/2017    HGB 9.7 (L) 06/17/2017    HGB 10.8 (L) 06/14/2017    HCT 30.4 (L) 06/17/2017    HCT 32.7 (L) 06/14/2017     06/17/2017     06/14/2017     BMP:   Lab Results   Component Value Date     09/26/2022     06/17/2017    POTASSIUM 3.6 09/26/2022    POTASSIUM 3.3 (L) 06/17/2017    CHLORIDE 108 (H) 09/26/2022    CHLORIDE 112 (H) 06/17/2017    CO2 25 09/26/2022    CO2 23 06/17/2017    BUN 13.1 09/26/2022    BUN 8 06/17/2017    CR 0.76 09/26/2022    CR 0.76 06/17/2017     (H) 09/26/2022    GLC 99 06/17/2017     COAGS: No results found for: PTT, INR, FIBR  POC: No results found for: BGM, HCG, HCGS  HEPATIC:   Lab Results   Component Value Date    ALT 25 12/13/2021     OTHER:   Lab Results   Component Value Date    JUNE 9.4 09/26/2022       Anesthesia Plan    ASA Status:  2   NPO Status:  NPO Appropriate    Anesthesia Type: General.     - Airway: LMA   Induction: Propofol, Intravenous.   Maintenance: TIVA.        Consents    Anesthesia Plan(s) and associated risks, benefits, and realistic alternatives discussed. Questions answered and patient/representative(s) expressed understanding.    - Discussed:     - Discussed with:  Patient         Postoperative Care    Pain management: Multi-modal analgesia.   PONV prophylaxis: Ondansetron (or other 5HT-3), Dexamethasone or Solumedrol, Background Propofol  Infusion     Comments:    Other Comments: Kat Turner MD, MD

## 2022-10-03 NOTE — ANESTHESIA POSTPROCEDURE EVALUATION
Patient: Zulema Carrillo    Procedure: Procedure(s):  BILATERAL BREAST IMPLANT REMOVAL AND TOTAL PERIPROSTHETIC CAPSULECTOMIES, BILATERAL INFERIOR DERMAL FLAPS       Anesthesia Type:  General    Note:     Postop Pain Control: Uneventful            Sign Out: Well controlled pain   PONV: No   Neuro/Psych: Uneventful            Sign Out: Acceptable/Baseline neuro status   Airway/Respiratory: Uneventful            Sign Out: Acceptable/Baseline resp. status   CV/Hemodynamics: Uneventful            Sign Out: Acceptable CV status; No obvious hypovolemia; No obvious fluid overload   Other NRE: NONE   DID A NON-ROUTINE EVENT OCCUR? No           Last vitals:  Vitals Value Taken Time   /78 10/03/22 1515   Temp 36.2  C (97.2  F) 10/03/22 1424   Pulse 70 10/03/22 1519   Resp 14 10/03/22 1519   SpO2 93 % 10/03/22 1519   Vitals shown include unvalidated device data.    Electronically Signed By: Rona Turner MD, MD  October 3, 2022  3:20 PM

## 2022-10-03 NOTE — ANESTHESIA CARE TRANSFER NOTE
Patient: Zulema Carrillo    Procedure: Procedure(s):  BILATERAL BREAST IMPLANT REMOVAL AND TOTAL PERIPROSTHETIC CAPSULECTOMIES, BILATERAL INFERIOR DERMAL FLAPS       Diagnosis: History of mastectomy [Z90.10]  Breast pain [N64.4]  History of surgery [Z98.890]  Diagnosis Additional Information: No value filed.    Anesthesia Type:   General     Note:    Oropharynx: oropharynx clear of all foreign objects  Level of Consciousness: awake  Oxygen Supplementation: face mask  Level of Supplemental Oxygen (L/min / FiO2): 10  Independent Airway: airway patency satisfactory and stable  Dentition: dentition unchanged  Vital Signs Stable: post-procedure vital signs reviewed and stable  Report to RN Given: handoff report given  Patient transferred to: PACU  Comments: Spont. Resps, pt. Responding.  Extubated, sufficient air exchange. Oxygen mask placed with 10 L O2. To PACU VSS, Monitors on. Report to RN  Handoff Report: Identifed the Patient, Identified the Reponsible Provider, Reviewed the pertinent medical history, Discussed the surgical course, Reviewed Intra-OP anesthesia mangement and issues during anesthesia, Set expectations for post-procedure period and Allowed opportunity for questions and acknowledgement of understanding      Vitals:  Vitals Value Taken Time   /76 10/03/22 1424   Temp     Pulse 81 10/03/22 1428   Resp 23 10/03/22 1428   SpO2 96 % 10/03/22 1428   Vitals shown include unvalidated device data.    Electronically Signed By: RAUL Moran CRNA  October 3, 2022  2:29 PM

## 2022-10-03 NOTE — BRIEF OP NOTE
[facility]    Brief Operative Note    Pre-operative diagnosis: History of mastectomy [Z90.10]  Breast pain [N64.4]  History of surgery [Z98.890]  BRCA1 gene mutation carrier  Post-operative diagnosis Same as pre-operative diagnosis    Procedure: Procedure(s):  BILATERAL BREAST IMPLANT REMOVAL AND TOTAL PERIPROSTHETIC CAPSULECTOMIES, BILATERAL INFERIOR DERMAL FLAPS  Surgeon: Surgeon(s) and Role:     * Nell Pearl MD - Primary     * Myriam Lee PA-C - Assisting  Anesthesia: General   Estimated Blood Loss: 20 mL from 10/3/2022 10:33 AM to 10/3/2022  2:22 PM      Drains: Lb-Foss x 2  Specimens:   ID Type Source Tests Collected by Time Destination   1 : Implant, gross only Implant Breast, Left SURGICAL PATHOLOGY EXAM Nell Pearl MD 10/3/2022 11:27 AM    2 : Capsule Capsule Breast, Left SURGICAL PATHOLOGY EXAM Nell Pearl MD 10/3/2022 11:47 AM    3 : Implant, gross only Implant Breast, Right SURGICAL PATHOLOGY EXAM Nell Pearl MD 10/3/2022 12:10 PM    4 : Capsule Tissue Breast, Right SURGICAL PATHOLOGY EXAM Nell Pearl MD 10/3/2022 12:24 PM    5 : LEFT MEDIAL BREAST TISSUE FOR PERMANENT Tissue Breast, Left SURGICAL PATHOLOGY EXAM Nell Pearl MD 10/3/2022  1:12 PM    6 : Right breast tissue Tissue Breast, Right SURGICAL PATHOLOGY EXAM Nell Pearl MD 10/3/2022  1:38 PM      Findings:   None.  Complications: None.  Implants:   Implant Name Type Inv. Item Serial No.  Lot No. LRB No. Used Action   Right breast implant      Right 1 Explanted   Left breast implant      Left 1 Explanted

## 2022-10-03 NOTE — OP NOTE
DATE OF SERVICE: 10/03/22     SURGEON:  Nell Pearl MD    ASSISTANT:  CASSIE Morales PA-C, was present and scrubbed for the entire procedure and assisted with dissection, retraction and closure. There were no other qualified trainees or other assistants available and the presence of Lyndsey Lee PA-C, was necessary due to inability to retract and dissect without an assistant.     PREOPERATIVE DIAGNOSES:  1.  History of mastectomy [Z90.10]  2.  Breast pain [N64.4]  3.  History of surgery [Z98.890]  4.  BRCA1 gene mutation carrier [Z15.01]      POSTOPERATIVE DIAGNOSES:  1.  History of mastectomy [Z90.10]  2.  Breast pain [N64.4]  3.  History of surgery [Z98.890]  4.  BRCA1 gene mutation carrier [Z15.01]      PROCEDURES:  1. Bilateral subpectoral breast implant removal and total periprosthetic capsulectomies  2. Bilateral inferior dermal flaps, 200 square cm on each side.    DRAINAGE TYPE:  One 15-Japanese channel drain to each breast.    INDICATIONS:  The patient is 67 year-old-female who is a BRCA1 gene mutation carrier. She underwent bilateral mastectomies and subpectoral implant breast reconstruction in 2006. For a number of years, she has been dealing with breast pain, body aches and other non specific symptoms. She has elected to have both her implants removed without replacement. Prior authorization was obtained for the procedure and a written witnessed informed consent was obtained prior to surgery.    DESCRIPTION OF PROCEDURE:  The patient was identified and marked in the preoperative holding area. She was taken to the operating room and placed supine on the operating table. Sequential compression devices were applied to the lower extremities. After successful general anesthesia and endotracheal intubation, the patient was prepared and draped in the usual sterile fashion. An intentional pause was then performed, confirming the patient identity, the procedure to be performed, the  laterality, the patient's allergies, and the administration of the necessary antibiotics by anesthesia.     I initially turned my attention to the left breast, where the mastectomy scar was incised through the dermis with a 10 blade. The subcutaneous tissues were dissected in the prepectoral plane to free the pectoralis major muscle. Following this, I performed the subpectoral circumferential capsulectomy. I noticed some silicone bleed. Once the anterior capsulectomy was performed, I incised the capsule and removed the implant. The implant was intact but fragile, with a good amount of silicone bleed. The implant was a smooth round device. The capsulectomy was completed without difficulty. The breast pocket was then controlled for hemostasis, and irrigated with Clindamycin solution followed by diluted peroxide solution to clean out the silicone. A 15 [place] Island[place] channel drain was placed subpectorally.  The pectoralis major muscle was then repaired in anatomic position with figure-of-eight 2-0 PDS sutures.The skin was temporarily stapled.     I then turned my attention to the right side where here again the the mastectomy scar was incised through the dermis with a 10 blade. The subcutaneous tissues were dissected in the prepectoral plane to free the pectoralis major muscle. Following this, I performed the subpectoral circumferential capsulectomy. Once the anterior capsulectomy was performed, I incised the capsule and removed the implant. The implant was intact on this side, and no silicone bleed was identified. The implant was also a smooth round device. The capsulectomy was completed without difficulty. The breast pocket was then controlled for hemostasis, and irrigated with Clindamycin solution followed by diluted peroxide solution. A 15 [place] Island[place] channel drain was placed subpectorally.  The pectoralis major muscle was then repaired in anatomic position with figure-of-eight 2-0 PDS sutures.The skin was temporarily stapled.      Following this, hemostasis was controlled once again. The skin was temporarily closed with staples and the patient was brought to the sitting position. A few adjustments were made, including excision of standing cones laterally. It was also found that the overall appearance would be improved by excising thick tissue at the midline, thus joining the scars over the sternum. With the lower skin flaps imbricated under the upper skin flaps, the inferior dermal flaps were fashioned on both sides. These measured 200 square cm per side. They were marked and deepithelialized. Hemostasis was obtained. The inferior dermal flaps were then anchored to the lateral edge of the pectoralis major muscle using simple interrupted 2-0 PDS. Following this, we proceeded to close the incisions bilaterally using deep dermal 3-0 Monocryl followed by a subcuticular 2-0 Monocryl Stratafix.     The incisions were covered with Xeroform, gauze, ABD pads and an Ace wrap. The drain sites were covered with Biopatch and Tegaderm on both sides. At the end of the procedure, all sponge, needle and instrument counts were correct. The patient tolerated the procedure well. She was awakened and sent to the recovery room in satisfactory condition.    JOHNNIE DIEGO MD, MPH

## 2022-10-03 NOTE — OR NURSING
Pt dressed, up in recliner and transported to Phase 2. PENNY instructions reviewed in phase 1, PENNY supplies placed in pt's belongings bag. Pt able to void.

## 2022-10-04 LAB
PATH REPORT.COMMENTS IMP SPEC: NORMAL
PATH REPORT.COMMENTS IMP SPEC: NORMAL
PATH REPORT.FINAL DX SPEC: NORMAL
PATH REPORT.GROSS SPEC: NORMAL
PATH REPORT.MICROSCOPIC SPEC OTHER STN: NORMAL
PATH REPORT.RELEVANT HX SPEC: NORMAL
PHOTO IMAGE: NORMAL

## 2022-10-04 PROCEDURE — 88304 TISSUE EXAM BY PATHOLOGIST: CPT | Mod: 26 | Performed by: PATHOLOGY

## 2022-10-04 PROCEDURE — 88305 TISSUE EXAM BY PATHOLOGIST: CPT | Mod: 26 | Performed by: PATHOLOGY

## 2022-10-04 PROCEDURE — 88300 SURGICAL PATH GROSS: CPT | Mod: 26 | Performed by: PATHOLOGY

## 2022-10-04 NOTE — OR NURSING
Pt having some difficulty with tylenol tabs due to corn and allergy and is having a mild allergic reaction.  Pt has found an alternative and  is trying to find but also had question about another supplement.  Pt encourage to contact Dr Daniel xie

## 2022-10-16 ENCOUNTER — HEALTH MAINTENANCE LETTER (OUTPATIENT)
Age: 67
End: 2022-10-16

## 2022-10-24 NOTE — PROGRESS NOTES
HISTORY OF PRESENT ILLNESS:    This is a 67 year old female who follows with Dr Rollins at [facility]  Her past medical history includes:  Hypertension, elevated calcium score, breast cancer s/p remote mastectomy,  hyperlipidemia, sleep apnea, GERD, osteoarthritis, and obesity    Ms Carrillo was evaluated for exertional shortness of breath (7/2021)  ECHO showed LVEF 60-65% without regional wall motion abnormalities, trace MR, no evidence of mitral valve prolapse, normal pulmonary pressures    Exercise NUC (7/2021) showed no significant myocardial perfusion defects LVEF 68%  CT calcium score was 10.3 placing her in the 72 percentile.  Her pravastatin was changed to Crestor last year    Labs (9/26/22)  Total Cholesterol: 133  Triglycerides: 59  HDL: 48  LDL: 73    Our visit today is for annual review     Ms Carrillo is recovering from her mastectomy  She is working on increasing her exercise  She gets mildly winded when going up hills  Overall, she denies any significant shortness of breath or chest pain.  She sleeps well with her CPAP and denies any palpitations, orthopnea, or peripheral edema  We talked about reducing sugars in diet and the Mediterranean diet    VITAL SIGNS:  BP: 116/68  Pulse: 75  Weight:  173 lbs (down 6 lbs)  BMI: 27    IMPRESSION AND PLAN:    Hyperlipidemia  -on Crestor 20 mg  -LDL at goal    Hypertension:  -on Losartan 25 mg  -BP controlled    Treated Sleep Apnea  The total time for the visit today was 18 minutes which includes patient visit, reviewing of records, discussion, and placing of orders of the outpatient coordination of cardiovascular care as described.  The level of medical decision making during this visit was of mild complexity.  Thank you for allowing me to participate in their care.  Orders Placed This Encounter   Procedures     Lipid Profile     ALT     Follow-Up with Cardiology       Orders Placed This Encounter   Medications     rosuvastatin (CRESTOR) 20 MG tablet      Sig: Take 1 tablet (20 mg) by mouth daily     Dispense:  90 tablet     Refill:  3       Medications Discontinued During This Encounter   Medication Reason     oxyCODONE (ROXICODONE-INTENSOL) 20 mg/mL CONC (HIGH CONC) solution Stopped by Patient     rosuvastatin (CRESTOR) 20 MG tablet          Encounter Diagnoses   Name Primary?     Essential hypertension Yes     Pure hypercholesterolemia        CURRENT MEDICATIONS:  Current Outpatient Medications   Medication Sig Dispense Refill     rosuvastatin (CRESTOR) 20 MG tablet Take 1 tablet (20 mg) by mouth daily 90 tablet 3     albuterol (PROAIR HFA/PROVENTIL HFA/VENTOLIN HFA) 108 (90 Base) MCG/ACT inhaler Inhale 2 puffs into the lungs every 6 hours       ALPRAZolam (XANAX PO) Take 0.5 mg by mouth 3 times daily as needed for anxiety       EPINEPHrine (ANY BX GENERIC EQUIV) 0.3 MG/0.3ML injection 2-pack Inject 0.3 mg into the muscle as needed for anaphylaxis May repeat one time in 5-15 minutes if response to initial dose is inadequate.       famotidine (PEPCID) 10 MG tablet Take 10 mg by mouth 2 times daily as needed       Losartan Potassium (COZAAR PO) Take 25 mg by mouth every morning        Menatetrenone (VITAMIN K2) 100 MCG TABS Take 1 tablet by mouth daily as needed       multivitamin, therapeutic with minerals (THERA-VIT-M) TABS tablet Take 1 tablet by mouth daily       sertraline (ZOLOFT) 100 MG tablet Take 150 mg by mouth every morning       triamcinolone (NASACORT ALLERGY 24HR) 55 MCG/ACT nasal aerosol Spray 2 sprays into both nostrils daily       VITAMIN D, CHOLECALCIFEROL, PO Take 2,000 Units by mouth every morning         ALLERGIES     Allergies   Allergen Reactions     Aspirin Hives     Corn-Related Products Other (See Comments), Hives and Itching     Congested and agitated     Glucosamine Itching     Hydrocodone-Acetaminophen Nausea     Ibuprofen Hives     Iodine Itching     Redness     Lisinopril Cough     Morphine Hives     Shellfish-Derived Products Hives  "    Benzonatate Anxiety     Cephalexin Rash     Ciprofloxacin Rash     Hydromorphone Rash     itching       PAST MEDICAL HISTORY:  Past Medical History:   Diagnosis Date     Anxiety      CAD (coronary artery disease)      Depression      Gastroesophageal reflux disease with esophagitis      HLD (hyperlipidemia)      HTN (hypertension)      Melanoma of skin (H)      Migraine      MVP (mitral valve prolapse)      CHELSEA (obstructive sleep apnea)     CPAP     Osteoarthritis      PONV (postoperative nausea and vomiting)      Reactive airway disease      Sjogren's syndrome (H)        PAST SURGICAL HISTORY:  Past Surgical History:   Procedure Laterality Date     AS TOTAL KNEE ARTHROPLASTY       BREAST SURGERY       HYSTERECTOMY       MASTECTOMY       MENISCECTOMY       REVISE RECONSTRUCTED BREAST BILATERAL Bilateral 10/3/2022    Procedure: BILATERAL BREAST IMPLANT REMOVAL AND TOTAL PERIPROSTHETIC CAPSULECTOMIES, BILATERAL INFERIOR DERMAL FLAPS;  Surgeon: Nell Pearl MD;  Location: [facility] OR     SALPINGO-OOPHORECTOMY BILATERAL         FAMILY HISTORY:  No family history on file.    SOCIAL HISTORY:  Social History     Socioeconomic History     Marital status:      Spouse name: None     Number of children: None     Years of education: None     Highest education level: None   Tobacco Use     Smoking status: Never     Smokeless tobacco: Never   Vaping Use     Vaping Use: Never used   Substance and Sexual Activity     Alcohol use: Yes     Comment: OCCASIONAL     Drug use: Never       Review of Systems:  Skin:          Eyes:         ENT:         Respiratory:  Positive for dyspnea on exertion;sleep apnea;CPAP     Cardiovascular:  Negative      Gastroenterology:        Genitourinary:         Musculoskeletal:         Neurologic:         Psychiatric:         Heme/Lymph/Imm:         Endocrine:           Physical Exam:  Vitals: /68   Pulse 75   Ht 1.702 m (5' 7\")   Wt 78.5 kg (173 lb)   SpO2 98%   BMI 27.10 kg/m  "     Constitutional:  cooperative overweight      Skin:  warm and dry to the touch          Head:  normocephalic        Eyes:  pupils equal and round        Lymph:      ENT:  no pallor or cyanosis        Neck:  no carotid bruit;JVP normal        Respiratory:  normal respiratory excursion;clear to auscultation         Cardiac: regular rhythm;no murmurs, gallops or rubs detected                pulses full and equal                                        GI:  abdomen soft        Extremities and Muscular Skeletal:  no edema;normal muscle strength and tone              Neurological:  no gross motor deficits        Psych:  Alert and Oriented x 3          CC  Referred Self, MD  No address on file

## 2022-10-26 ENCOUNTER — OFFICE VISIT (OUTPATIENT)
Dept: CARDIOLOGY | Facility: CLINIC | Age: 67
End: 2022-10-26
Payer: COMMERCIAL

## 2022-10-26 VITALS
HEART RATE: 75 BPM | OXYGEN SATURATION: 98 % | DIASTOLIC BLOOD PRESSURE: 68 MMHG | SYSTOLIC BLOOD PRESSURE: 116 MMHG | HEIGHT: 67 IN | WEIGHT: 173 LBS | BODY MASS INDEX: 27.15 KG/M2

## 2022-10-26 DIAGNOSIS — I10 ESSENTIAL HYPERTENSION: Primary | ICD-10-CM

## 2022-10-26 DIAGNOSIS — E78.00 PURE HYPERCHOLESTEROLEMIA: ICD-10-CM

## 2022-10-26 PROCEDURE — 99214 OFFICE O/P EST MOD 30 MIN: CPT | Performed by: NURSE PRACTITIONER

## 2022-10-26 RX ORDER — ROSUVASTATIN CALCIUM 20 MG/1
20 TABLET, COATED ORAL DAILY
Qty: 90 TABLET | Refills: 3 | Status: SHIPPED | OUTPATIENT
Start: 2022-10-26 | End: 2023-11-06

## 2022-10-26 NOTE — LETTER
10/26/2022    [facility] [address] [place] [address]    RE: Zulema Carrillo       Dear Colleague,     I had the pleasure of seeing Zulema Carrillo in the [facility].  HISTORY OF PRESENT ILLNESS:    This is a 67 year old female who follows with Dr Rollins at [facility]  Her past medical history includes:  Hypertension, elevated calcium score, breast cancer s/p remote mastectomy,  hyperlipidemia, sleep apnea, GERD, osteoarthritis, and obesity    Ms Carrillo was evaluated for exertional shortness of breath (7/2021)  ECHO showed LVEF 60-65% without regional wall motion abnormalities, trace MR, no evidence of mitral valve prolapse, normal pulmonary pressures    Exercise NUC (7/2021) showed no significant myocardial perfusion defects LVEF 68%  CT calcium score was 10.3 placing her in the 72 percentile.  Her pravastatin was changed to Crestor last year    Labs (9/26/22)  Total Cholesterol: 133  Triglycerides: 59  HDL: 48  LDL: 73    Our visit today is for annual review     Ms Carrillo is recovering from her mastectomy  She is working on increasing her exercise  She gets mildly winded when going up hills  Overall, she denies any significant shortness of breath or chest pain.  She sleeps well with her CPAP and denies any palpitations, orthopnea, or peripheral edema  We talked about reducing sugars in diet and the Mediterranean diet    VITAL SIGNS:  BP: 116/68  Pulse: 75  Weight:  173 lbs (down 6 lbs)  BMI: 27    IMPRESSION AND PLAN:    Hyperlipidemia  -on Crestor 20 mg  -LDL at goal    Hypertension:  -on Losartan 25 mg  -BP controlled    Treated Sleep Apnea  The total time for the visit today was 18 minutes which includes patient visit, reviewing of records, discussion, and placing of orders of the outpatient coordination of cardiovascular care as described.  The level of medical decision making during this visit was of mild complexity.  Thank you for  allowing me to participate in their care.  Orders Placed This Encounter   Procedures     Lipid Profile     ALT     Follow-Up with Cardiology       Orders Placed This Encounter   Medications     rosuvastatin (CRESTOR) 20 MG tablet     Sig: Take 1 tablet (20 mg) by mouth daily     Dispense:  90 tablet     Refill:  3       Medications Discontinued During This Encounter   Medication Reason     oxyCODONE (ROXICODONE-INTENSOL) 20 mg/mL CONC (HIGH CONC) solution Stopped by Patient     rosuvastatin (CRESTOR) 20 MG tablet          Encounter Diagnoses   Name Primary?     Essential hypertension Yes     Pure hypercholesterolemia        CURRENT MEDICATIONS:  Current Outpatient Medications   Medication Sig Dispense Refill     rosuvastatin (CRESTOR) 20 MG tablet Take 1 tablet (20 mg) by mouth daily 90 tablet 3     albuterol (PROAIR HFA/PROVENTIL HFA/VENTOLIN HFA) 108 (90 Base) MCG/ACT inhaler Inhale 2 puffs into the lungs every 6 hours       ALPRAZolam (XANAX PO) Take 0.5 mg by mouth 3 times daily as needed for anxiety       EPINEPHrine (ANY BX GENERIC EQUIV) 0.3 MG/0.3ML injection 2-pack Inject 0.3 mg into the muscle as needed for anaphylaxis May repeat one time in 5-15 minutes if response to initial dose is inadequate.       famotidine (PEPCID) 10 MG tablet Take 10 mg by mouth 2 times daily as needed       Losartan Potassium (COZAAR PO) Take 25 mg by mouth every morning        Menatetrenone (VITAMIN K2) 100 MCG TABS Take 1 tablet by mouth daily as needed       multivitamin, therapeutic with minerals (THERA-VIT-M) TABS tablet Take 1 tablet by mouth daily       sertraline (ZOLOFT) 100 MG tablet Take 150 mg by mouth every morning       triamcinolone (NASACORT ALLERGY 24HR) 55 MCG/ACT nasal aerosol Spray 2 sprays into both nostrils daily       VITAMIN D, CHOLECALCIFEROL, PO Take 2,000 Units by mouth every morning         ALLERGIES     Allergies   Allergen Reactions     Aspirin Hives     Corn-Related Products Other (See Comments),  Hives and Itching     Congested and agitated     Glucosamine Itching     Hydrocodone-Acetaminophen Nausea     Ibuprofen Hives     Iodine Itching     Redness     Lisinopril Cough     Morphine Hives     Shellfish-Derived Products Hives     Benzonatate Anxiety     Cephalexin Rash     Ciprofloxacin Rash     Hydromorphone Rash     itching       PAST MEDICAL HISTORY:  Past Medical History:   Diagnosis Date     Anxiety      CAD (coronary artery disease)      Depression      Gastroesophageal reflux disease with esophagitis      HLD (hyperlipidemia)      HTN (hypertension)      Melanoma of skin (H)      Migraine      MVP (mitral valve prolapse)      CHELSEA (obstructive sleep apnea)     CPAP     Osteoarthritis      PONV (postoperative nausea and vomiting)      Reactive airway disease      Sjogren's syndrome (H)        PAST SURGICAL HISTORY:  Past Surgical History:   Procedure Laterality Date     AS TOTAL KNEE ARTHROPLASTY       BREAST SURGERY       HYSTERECTOMY       MASTECTOMY       MENISCECTOMY       REVISE RECONSTRUCTED BREAST BILATERAL Bilateral 10/3/2022    Procedure: BILATERAL BREAST IMPLANT REMOVAL AND TOTAL PERIPROSTHETIC CAPSULECTOMIES, BILATERAL INFERIOR DERMAL FLAPS;  Surgeon: Nell Pearl MD;  Location: [facility] OR     SALPINGO-OOPHORECTOMY BILATERAL         FAMILY HISTORY:  No family history on file.    SOCIAL HISTORY:  Social History     Socioeconomic History     Marital status:      Spouse name: None     Number of children: None     Years of education: None     Highest education level: None   Tobacco Use     Smoking status: Never     Smokeless tobacco: Never   Vaping Use     Vaping Use: Never used   Substance and Sexual Activity     Alcohol use: Yes     Comment: OCCASIONAL     Drug use: Never       Review of Systems:  Skin:          Eyes:         ENT:         Respiratory:  Positive for dyspnea on exertion;sleep apnea;CPAP     Cardiovascular:  Negative      Gastroenterology:        Genitourinary:        "  Musculoskeletal:         Neurologic:         Psychiatric:         Heme/Lymph/Imm:         Endocrine:           Physical Exam:  Vitals: /68   Pulse 75   Ht 1.702 m (5' 7\")   Wt 78.5 kg (173 lb)   SpO2 98%   BMI 27.10 kg/m      Constitutional:  cooperative overweight      Skin:  warm and dry to the touch          Head:  normocephalic        Eyes:  pupils equal and round        Lymph:      ENT:  no pallor or cyanosis        Neck:  no carotid bruit;JVP normal        Respiratory:  normal respiratory excursion;clear to auscultation         Cardiac: regular rhythm;no murmurs, gallops or rubs detected                pulses full and equal                                        GI:  abdomen soft        Extremities and Muscular Skeletal:  no edema;normal muscle strength and tone              Neurological:  no gross motor deficits        Psych:  Alert and Oriented x 3      CC  Referred Self,     Thank you for allowing me to participate in the care of your patient.    Sincerely,   RAUL Mason CNP   [facility]  "

## 2022-12-03 ENCOUNTER — HEALTH MAINTENANCE LETTER (OUTPATIENT)
Age: 67
End: 2022-12-03

## 2023-10-24 ENCOUNTER — LAB (OUTPATIENT)
Dept: LAB | Facility: CLINIC | Age: 68
End: 2023-10-24
Attending: NURSE PRACTITIONER
Payer: COMMERCIAL

## 2023-10-24 DIAGNOSIS — E78.00 PURE HYPERCHOLESTEROLEMIA: ICD-10-CM

## 2023-10-24 LAB
ALT SERPL W P-5'-P-CCNC: 19 U/L (ref 0–50)
CHOLEST SERPL-MCNC: 130 MG/DL
HDLC SERPL-MCNC: 44 MG/DL
LDLC SERPL CALC-MCNC: 74 MG/DL
NONHDLC SERPL-MCNC: 86 MG/DL
TRIGL SERPL-MCNC: 62 MG/DL

## 2023-10-24 PROCEDURE — 84460 ALANINE AMINO (ALT) (SGPT): CPT | Performed by: NURSE PRACTITIONER

## 2023-10-24 PROCEDURE — 80061 LIPID PANEL: CPT | Performed by: NURSE PRACTITIONER

## 2023-10-24 PROCEDURE — 36415 COLL VENOUS BLD VENIPUNCTURE: CPT | Performed by: NURSE PRACTITIONER

## 2023-10-30 NOTE — RESULT ENCOUNTER NOTE
Results and recommendations routed Via My Chart with call back information if needed.   Lipids/ALT reviewed and at goal Follow up as planned in a week

## 2023-11-01 NOTE — PROGRESS NOTES
HISTORY OF PRESENT ILLNESS:     This is a 68 year old female who follows with Dr Rollins at [facility]  Her past medical history includes:  Hypertension, elevated calcium score, breast cancer s/p mastectomy,  hyperlipidemia, sleep apnea, asthma, osteoarthritis, and obesity     Ms Carrillo was evaluated for exertional shortness of breath (7/2021)  ECHO showed LVEF 60-65% without regional wall motion abnormalities, trace MR, no evidence of mitral valve prolapse, normal pulmonary pressures    Exercise NUC (7/2021) did not show any ischemia/infarction. LVEF 68%  CT calcium score was 10.3 placing her in the 72 percentile when compared to age and gender matched control group.      Labs (10/24/23)  Total cholesterol: 130  Triglycerides: 62  HDL: 44  LDL: 74  ALT: 19     Our visit today is for annual review     Ms Carrillo has ongoing issues with dyspnea on exertion for several years now.  Her breathing improves after using her inhaler  It has been some time since she has seen a Pulmonologist  She sees an Allergist.  She denies any chest pain, palpitations, orthopnea, or peripheral edema  She uses her CPAP faithfully           VITAL SIGNS:  BP:  116/68  Pulse:  76  Weight:   174 lbs  (BMI: 27) stable     IMPRESSION AND PLAN:     Hyperlipidemia  -on Crestor 20 mg  -LDL  74     Hypertension:  -on Losartan 25 mg  -BP controlled     Treated Sleep Apnea    The total time for the visit today was 25 minutes which includes patient visit, reviewing of records, discussion, and placing of orders of the outpatient coordination of cardiovascular care as described.  The level of medical decision making during this visit was of mild-moderate complexity.  Thank you for allowing me to participate in their care. Follow up planned next year    No orders of the defined types were placed in this encounter.      Orders Placed This Encounter   Medications    rosuvastatin (CRESTOR) 20 MG tablet     Sig: Take 1 tablet (20 mg) by mouth daily      Dispense:  90 tablet     Refill:  3       Medications Discontinued During This Encounter   Medication Reason    Menatetrenone (VITAMIN K2) 100 MCG TABS     famotidine (PEPCID) 10 MG tablet     rosuvastatin (CRESTOR) 20 MG tablet Reorder (No AVS)         Encounter Diagnosis   Name Primary?    Pure hypercholesterolemia        CURRENT MEDICATIONS:  Current Outpatient Medications   Medication Sig Dispense Refill    albuterol (PROAIR HFA/PROVENTIL HFA/VENTOLIN HFA) 108 (90 Base) MCG/ACT inhaler Inhale 2 puffs into the lungs every 6 hours      ALPRAZolam (XANAX PO) Take 0.5 mg by mouth 3 times daily as needed for anxiety      EPINEPHrine (ANY BX GENERIC EQUIV) 0.3 MG/0.3ML injection 2-pack Inject 0.3 mg into the muscle as needed for anaphylaxis May repeat one time in 5-15 minutes if response to initial dose is inadequate.      Losartan Potassium (COZAAR PO) Take 25 mg by mouth every morning       multivitamin, therapeutic with minerals (THERA-VIT-M) TABS tablet Take 1 tablet by mouth daily      rosuvastatin (CRESTOR) 20 MG tablet Take 1 tablet (20 mg) by mouth daily 90 tablet 3    sertraline (ZOLOFT) 100 MG tablet Take 150 mg by mouth every morning      triamcinolone (NASACORT ALLERGY 24HR) 55 MCG/ACT nasal aerosol Spray 2 sprays into both nostrils daily      VITAMIN D, CHOLECALCIFEROL, PO Take 2,000 Units by mouth every morning (Patient not taking: Reported on 11/6/2023)         ALLERGIES     Allergies   Allergen Reactions    Aspirin Hives    Corn-Containing Products Other (See Comments), Hives and Itching     Congested and agitated    Glucosamine Itching    Hydrocodone-Acetaminophen Nausea    Ibuprofen Hives    Iodine Itching     Redness    Lisinopril Cough    Morphine Hives    Shellfish-Derived Products Hives    Benzonatate Anxiety    Cephalexin Rash    Ciprofloxacin Rash    Hydromorphone Rash     itching       PAST MEDICAL HISTORY:  Past Medical History:   Diagnosis Date    Anxiety     CAD (coronary artery  "disease)     Depression     Gastroesophageal reflux disease with esophagitis     HLD (hyperlipidemia)     HTN (hypertension)     Melanoma of skin (H)     Migraine     MVP (mitral valve prolapse)     CHELSEA (obstructive sleep apnea)     CPAP    Osteoarthritis     PONV (postoperative nausea and vomiting)     Reactive airway disease     Sjogren's syndrome (H24)        PAST SURGICAL HISTORY:  Past Surgical History:   Procedure Laterality Date    AS TOTAL KNEE ARTHROPLASTY      BREAST SURGERY      HYSTERECTOMY      MASTECTOMY      MENISCECTOMY      REVISE RECONSTRUCTED BREAST BILATERAL Bilateral 10/3/2022    Procedure: BILATERAL BREAST IMPLANT REMOVAL AND TOTAL PERIPROSTHETIC CAPSULECTOMIES, BILATERAL INFERIOR DERMAL FLAPS;  Surgeon: Nell Pearl MD;  Location: [facility] OR    SALPINGO-OOPHORECTOMY BILATERAL         FAMILY HISTORY:  History reviewed. No pertinent family history.    SOCIAL HISTORY:  Social History     Socioeconomic History    Marital status:      Spouse name: None    Number of children: None    Years of education: None    Highest education level: None   Tobacco Use    Smoking status: Never    Smokeless tobacco: Never   Vaping Use    Vaping Use: Never used   Substance and Sexual Activity    Alcohol use: Yes     Comment: OCCASIONAL    Drug use: Never       Review of Systems:  Skin:          Eyes:         ENT:         Respiratory:  Positive for dyspnea on exertion;sleep apnea;CPAP HERRON for 2 months when coming back from [place]   Cardiovascular:  Negative      Gastroenterology:        Genitourinary:         Musculoskeletal:         Neurologic:         Psychiatric:         Heme/Lymph/Imm:         Endocrine:           Physical Exam:  Vitals: /68 (BP Location: Right arm, Patient Position: Sitting, Cuff Size: Adult Regular)   Pulse 76   Ht 1.702 m (5' 7\")   Wt 79 kg (174 lb 3.2 oz)   SpO2 97%   BMI 27.28 kg/m      Constitutional:  cooperative overweight      Skin:  warm and dry to the touch      "     Head:  normocephalic        Eyes:  pupils equal and round        Lymph:      ENT:  no pallor or cyanosis        Neck:  no carotid bruit;JVP normal        Respiratory:  normal respiratory excursion;clear to auscultation         Cardiac: regular rhythm;no murmurs, gallops or rubs detected                pulses full and equal                                        GI:  abdomen soft        Extremities and Muscular Skeletal:  no edema;normal muscle strength and tone              Neurological:  no gross motor deficits        Psych:  Alert and Oriented x 3          CC  No referring provider defined for this encounter.

## 2023-11-04 ENCOUNTER — HEALTH MAINTENANCE LETTER (OUTPATIENT)
Age: 68
End: 2023-11-04

## 2023-11-06 ENCOUNTER — OFFICE VISIT (OUTPATIENT)
Dept: CARDIOLOGY | Facility: CLINIC | Age: 68
End: 2023-11-06
Payer: COMMERCIAL

## 2023-11-06 VITALS
OXYGEN SATURATION: 97 % | HEART RATE: 76 BPM | SYSTOLIC BLOOD PRESSURE: 116 MMHG | BODY MASS INDEX: 27.34 KG/M2 | WEIGHT: 174.2 LBS | DIASTOLIC BLOOD PRESSURE: 68 MMHG | HEIGHT: 67 IN

## 2023-11-06 DIAGNOSIS — E78.00 PURE HYPERCHOLESTEROLEMIA: ICD-10-CM

## 2023-11-06 PROCEDURE — 99213 OFFICE O/P EST LOW 20 MIN: CPT | Performed by: NURSE PRACTITIONER

## 2023-11-06 RX ORDER — ROSUVASTATIN CALCIUM 20 MG/1
20 TABLET, COATED ORAL DAILY
Qty: 90 TABLET | Refills: 3 | Status: SHIPPED | OUTPATIENT
Start: 2023-11-06

## 2023-11-06 NOTE — LETTER
11/6/2023    [facility]  [address] [place] [address]    RE: Zulema BOO Lorena       Dear Colleague,     I had the pleasure of seeing Zulema Carrillo in the [facility].  HISTORY OF PRESENT ILLNESS:     This is a 68 year old female who follows with Dr Rollins at [facility]  Her past medical history includes:  Hypertension, elevated calcium score, breast cancer s/p mastectomy,  hyperlipidemia, sleep apnea, asthma, osteoarthritis, and obesity     Ms Carrillo was evaluated for exertional shortness of breath (7/2021)  ECHO showed LVEF 60-65% without regional wall motion abnormalities, trace MR, no evidence of mitral valve prolapse, normal pulmonary pressures    Exercise NUC (7/2021) did not show any ischemia/infarction. LVEF 68%  CT calcium score was 10.3 placing her in the 72 percentile when compared to age and gender matched control group.      Labs (10/24/23)  Total cholesterol: 130  Triglycerides: 62  HDL: 44  LDL: 74  ALT: 19     Our visit today is for annual review     Ms Carrillo has ongoing issues with dyspnea on exertion for several years now.  Her breathing improves after using her inhaler  It has been some time since she has seen a Pulmonologist  She sees an Allergist.  She denies any chest pain, palpitations, orthopnea, or peripheral edema  She uses her CPAP faithfully           VITAL SIGNS:  BP:  116/68  Pulse:  76  Weight:   174 lbs  (BMI: 27) stable     IMPRESSION AND PLAN:     Hyperlipidemia  -on Crestor 20 mg  -LDL  74     Hypertension:  -on Losartan 25 mg  -BP controlled     Treated Sleep Apnea    The total time for the visit today was 25 minutes which includes patient visit, reviewing of records, discussion, and placing of orders of the outpatient coordination of cardiovascular care as described.  The level of medical decision making during this visit was of mild-moderate complexity.  Thank you for allowing me to participate in their care. Follow up planned next  year    No orders of the defined types were placed in this encounter.      Orders Placed This Encounter   Medications    rosuvastatin (CRESTOR) 20 MG tablet     Sig: Take 1 tablet (20 mg) by mouth daily     Dispense:  90 tablet     Refill:  3       Medications Discontinued During This Encounter   Medication Reason    Menatetrenone (VITAMIN K2) 100 MCG TABS     famotidine (PEPCID) 10 MG tablet     rosuvastatin (CRESTOR) 20 MG tablet Reorder (No AVS)         Encounter Diagnosis   Name Primary?    Pure hypercholesterolemia        CURRENT MEDICATIONS:  Current Outpatient Medications   Medication Sig Dispense Refill    albuterol (PROAIR HFA/PROVENTIL HFA/VENTOLIN HFA) 108 (90 Base) MCG/ACT inhaler Inhale 2 puffs into the lungs every 6 hours      ALPRAZolam (XANAX PO) Take 0.5 mg by mouth 3 times daily as needed for anxiety      EPINEPHrine (ANY BX GENERIC EQUIV) 0.3 MG/0.3ML injection 2-pack Inject 0.3 mg into the muscle as needed for anaphylaxis May repeat one time in 5-15 minutes if response to initial dose is inadequate.      Losartan Potassium (COZAAR PO) Take 25 mg by mouth every morning       multivitamin, therapeutic with minerals (THERA-VIT-M) TABS tablet Take 1 tablet by mouth daily      rosuvastatin (CRESTOR) 20 MG tablet Take 1 tablet (20 mg) by mouth daily 90 tablet 3    sertraline (ZOLOFT) 100 MG tablet Take 150 mg by mouth every morning      triamcinolone (NASACORT ALLERGY 24HR) 55 MCG/ACT nasal aerosol Spray 2 sprays into both nostrils daily      VITAMIN D, CHOLECALCIFEROL, PO Take 2,000 Units by mouth every morning (Patient not taking: Reported on 11/6/2023)         ALLERGIES     Allergies   Allergen Reactions    Aspirin Hives    Corn-Containing Products Other (See Comments), Hives and Itching     Congested and agitated    Glucosamine Itching    Hydrocodone-Acetaminophen Nausea    Ibuprofen Hives    Iodine Itching     Redness    Lisinopril Cough    Morphine Hives    Shellfish-Derived Products Hives     Benzonatate Anxiety    Cephalexin Rash    Ciprofloxacin Rash    Hydromorphone Rash     itching       PAST MEDICAL HISTORY:  Past Medical History:   Diagnosis Date    Anxiety     CAD (coronary artery disease)     Depression     Gastroesophageal reflux disease with esophagitis     HLD (hyperlipidemia)     HTN (hypertension)     Melanoma of skin (H)     Migraine     MVP (mitral valve prolapse)     CHELSEA (obstructive sleep apnea)     CPAP    Osteoarthritis     PONV (postoperative nausea and vomiting)     Reactive airway disease     Sjogren's syndrome (H24)        PAST SURGICAL HISTORY:  Past Surgical History:   Procedure Laterality Date    AS TOTAL KNEE ARTHROPLASTY      BREAST SURGERY      HYSTERECTOMY      MASTECTOMY      MENISCECTOMY      REVISE RECONSTRUCTED BREAST BILATERAL Bilateral 10/3/2022    Procedure: BILATERAL BREAST IMPLANT REMOVAL AND TOTAL PERIPROSTHETIC CAPSULECTOMIES, BILATERAL INFERIOR DERMAL FLAPS;  Surgeon: eNll Pearl MD;  Location: [facility] OR    SALPINGO-OOPHORECTOMY BILATERAL         FAMILY HISTORY:  History reviewed. No pertinent family history.    SOCIAL HISTORY:  Social History     Socioeconomic History    Marital status:      Spouse name: None    Number of children: None    Years of education: None    Highest education level: None   Tobacco Use    Smoking status: Never    Smokeless tobacco: Never   Vaping Use    Vaping Use: Never used   Substance and Sexual Activity    Alcohol use: Yes     Comment: OCCASIONAL    Drug use: Never       Review of Systems:  Skin:          Eyes:         ENT:         Respiratory:  Positive for dyspnea on exertion;sleep apnea;CPAP HERRON for 2 months when coming back from [place]   Cardiovascular:  Negative      Gastroenterology:        Genitourinary:         Musculoskeletal:         Neurologic:         Psychiatric:         Heme/Lymph/Imm:         Endocrine:           Physical Exam:  Vitals: /68 (BP Location: Right arm, Patient Position: Sitting, Cuff Size:  "Adult Regular)   Pulse 76   Ht 1.702 m (5' 7\")   Wt 79 kg (174 lb 3.2 oz)   SpO2 97%   BMI 27.28 kg/m      Constitutional:  cooperative overweight      Skin:  warm and dry to the touch          Head:  normocephalic        Eyes:  pupils equal and round        Lymph:      ENT:  no pallor or cyanosis        Neck:  no carotid bruit;JVP normal        Respiratory:  normal respiratory excursion;clear to auscultation         Cardiac: regular rhythm;no murmurs, gallops or rubs detected                pulses full and equal                                        GI:  abdomen soft        Extremities and Muscular Skeletal:  no edema;normal muscle strength and tone              Neurological:  no gross motor deficits        Psych:  Alert and Oriented x 3          CC  No referring provider defined for this encounter.                      Thank you for allowing me to participate in the care of your patient.      Sincerely,     RAUL [facility]  "

## 2024-04-08 RX ORDER — CETIRIZINE HYDROCHLORIDE 10 MG/1
10 TABLET ORAL DAILY PRN
COMMUNITY

## 2024-04-08 RX ORDER — FLUTICASONE PROPIONATE AND SALMETEROL 100; 50 UG/1; UG/1
1 POWDER RESPIRATORY (INHALATION) 2 TIMES DAILY
COMMUNITY
Start: 2023-12-29

## 2024-04-08 NOTE — PROGRESS NOTES
Planning to discharge home on POD 1 in the morning.       04/05/24 1002   Discharge Planning   Patient/Family Anticipates Transition to home with family   Living Arrangements   Type of Residence Private Residence   Once home, are you able to live on one level? No   Equipment Currently Used at Home raised toilet seat   Support System   Support Systems Spouse/Significant Other   Do you have someone available to stay with you one or two nights once you are home? Yes

## 2024-04-10 NOTE — PROGRESS NOTES
Scheduled for left total knee arthroplasty 4/11/24 with Dr. Lombardi at [facility].    Informed on 4/8/24 by Dr. Lombardi's assistant, Flori, that patient has a nickel and corn/corn filler allergy. Because of the corn allergy, the patient has reactions to many oral medications in the pill form. The patient will be bringing in her own medications from home to use in the hospital. The patient can usually tolerate oral liquid forms of medications. Checked with the Pharmacist at [facility] on the ingredients of the liquid oxycodone that the hospital has in stock for the patient to take after surgery. Called the patient and she said that she should be able to tolerate the ingredients in the liquid oxycodone.   Pharmacy recommends that IV TXA be used for this patient, instead of oral TXA.    Leann Perez RN

## 2024-04-11 ENCOUNTER — ANESTHESIA EVENT (OUTPATIENT)
Dept: SURGERY | Facility: CLINIC | Age: 69
End: 2024-04-11
Payer: COMMERCIAL

## 2024-04-11 ENCOUNTER — ANESTHESIA (OUTPATIENT)
Dept: SURGERY | Facility: CLINIC | Age: 69
End: 2024-04-11
Payer: COMMERCIAL

## 2024-04-11 ENCOUNTER — HOSPITAL ENCOUNTER (OUTPATIENT)
Facility: CLINIC | Age: 69
Discharge: HOME | End: 2024-04-12
Attending: ORTHOPAEDIC SURGERY | Admitting: ORTHOPAEDIC SURGERY
Payer: COMMERCIAL

## 2024-04-11 DIAGNOSIS — M17.12 PRIMARY OSTEOARTHRITIS OF LEFT KNEE: Primary | ICD-10-CM

## 2024-04-11 LAB
CREAT SERPL-MCNC: 0.79 MG/DL (ref 0.51–0.95)
EGFRCR SERPLBLD CKD-EPI 2021: 82 ML/MIN/1.73M2

## 2024-04-11 PROCEDURE — 258N000003 HC RX IP 258 OP 636: Performed by: ANESTHESIOLOGY

## 2024-04-11 PROCEDURE — 258N000003 HC RX IP 258 OP 636: Performed by: ORTHOPAEDIC SURGERY

## 2024-04-11 PROCEDURE — 36415 COLL VENOUS BLD VENIPUNCTURE: CPT | Performed by: ORTHOPAEDIC SURGERY

## 2024-04-11 PROCEDURE — 250N000009 HC RX 250: Performed by: PHYSICIAN ASSISTANT

## 2024-04-11 PROCEDURE — 250N000011 HC RX IP 250 OP 636: Performed by: NURSE ANESTHETIST, CERTIFIED REGISTERED

## 2024-04-11 PROCEDURE — 99207 PR APP CREDIT; MD BILLING SHARED VISIT: CPT

## 2024-04-11 PROCEDURE — 250N000013 HC RX MED GY IP 250 OP 250 PS 637: Performed by: ORTHOPAEDIC SURGERY

## 2024-04-11 PROCEDURE — 272N000001 HC OR GENERAL SUPPLY STERILE: Performed by: ORTHOPAEDIC SURGERY

## 2024-04-11 PROCEDURE — C1776 JOINT DEVICE (IMPLANTABLE): HCPCS | Performed by: ORTHOPAEDIC SURGERY

## 2024-04-11 PROCEDURE — 360N000077 HC SURGERY LEVEL 4, PER MIN: Performed by: ORTHOPAEDIC SURGERY

## 2024-04-11 PROCEDURE — 250N000011 HC RX IP 250 OP 636: Performed by: ANESTHESIOLOGY

## 2024-04-11 PROCEDURE — 82565 ASSAY OF CREATININE: CPT | Performed by: ORTHOPAEDIC SURGERY

## 2024-04-11 PROCEDURE — 250N000009 HC RX 250: Performed by: ORTHOPAEDIC SURGERY

## 2024-04-11 PROCEDURE — 250N000009 HC RX 250: Performed by: ANESTHESIOLOGY

## 2024-04-11 PROCEDURE — 250N000011 HC RX IP 250 OP 636: Performed by: PHYSICIAN ASSISTANT

## 2024-04-11 PROCEDURE — G2212 PROLONG OUTPT/OFFICE VIS: HCPCS | Performed by: STUDENT IN AN ORGANIZED HEALTH CARE EDUCATION/TRAINING PROGRAM

## 2024-04-11 PROCEDURE — C1713 ANCHOR/SCREW BN/BN,TIS/BN: HCPCS | Performed by: ORTHOPAEDIC SURGERY

## 2024-04-11 PROCEDURE — 258N000001 HC RX 258: Performed by: ORTHOPAEDIC SURGERY

## 2024-04-11 PROCEDURE — 99205 OFFICE O/P NEW HI 60 MIN: CPT | Performed by: STUDENT IN AN ORGANIZED HEALTH CARE EDUCATION/TRAINING PROGRAM

## 2024-04-11 PROCEDURE — 710N000010 HC RECOVERY PHASE 1, LEVEL 2, PER MIN: Performed by: ORTHOPAEDIC SURGERY

## 2024-04-11 PROCEDURE — 370N000017 HC ANESTHESIA TECHNICAL FEE, PER MIN: Performed by: ORTHOPAEDIC SURGERY

## 2024-04-11 PROCEDURE — 999N000141 HC STATISTIC PRE-PROCEDURE NURSING ASSESSMENT: Performed by: ORTHOPAEDIC SURGERY

## 2024-04-11 PROCEDURE — 250N000009 HC RX 250: Performed by: NURSE ANESTHETIST, CERTIFIED REGISTERED

## 2024-04-11 PROCEDURE — 258N000003 HC RX IP 258 OP 636: Performed by: NURSE ANESTHETIST, CERTIFIED REGISTERED

## 2024-04-11 PROCEDURE — 99207 PR NO BILLABLE SERVICE THIS VISIT: CPT | Performed by: STUDENT IN AN ORGANIZED HEALTH CARE EDUCATION/TRAINING PROGRAM

## 2024-04-11 PROCEDURE — 250N000011 HC RX IP 250 OP 636: Performed by: ORTHOPAEDIC SURGERY

## 2024-04-11 DEVICE — LEGION NARROW CRUCIATE RETAINING                                    OXINIUM SIZE 5N LEFT
Type: IMPLANTABLE DEVICE | Site: KNEE | Status: FUNCTIONAL
Brand: LEGION

## 2024-04-11 DEVICE — LEGION CR HIGH FLEX XLPE SZ 3-4 12MM
Type: IMPLANTABLE DEVICE | Site: KNEE | Status: FUNCTIONAL
Brand: LEGION

## 2024-04-11 DEVICE — SIMPLEX® HV IS A FAST-SETTING ACRYLIC RESIN FOR USE IN BONE SURGERY. MIXING THE TWO SEPARATE STERILE COMPONENTS PRODUCES A DUCTILE BONE CEMENT WHICH, AFTER HARDENING, FIXES THE IMPLANT AND TRANSFERS STRESSES PRODUCED DURING MOVEMENT EVENLY TO THE BONE. SIMPLEX® HV CEMENT POWDER ALSO CONTAINS INSOLUBLE ZIRCONIUM DIOXIDE AS AN X-RAY CONTRAST MEDIUM. SIMPLEX® HV DOES NOT EMIT A SIGNAL AND DOES NOT POSE A SAFETY RISK IN A MAGNETIC RESONANCE ENVIRONMENT.
Type: IMPLANTABLE DEVICE | Site: KNEE | Status: FUNCTIONAL
Brand: SIMPLEX HV

## 2024-04-11 DEVICE — GENESIS II RESURFACING PATELLAR                                    PROSTHESIS  29MM
Type: IMPLANTABLE DEVICE | Site: KNEE | Status: FUNCTIONAL
Brand: GENESIS II

## 2024-04-11 DEVICE — GENESIS II NON-POROUS TIBIAL                                    BASEPLATE SIZE 3 LEFT
Type: IMPLANTABLE DEVICE | Site: KNEE | Status: FUNCTIONAL
Brand: GENESIS II

## 2024-04-11 RX ORDER — LIDOCAINE 40 MG/G
CREAM TOPICAL
Status: DISCONTINUED | OUTPATIENT
Start: 2024-04-11 | End: 2024-04-12 | Stop reason: HOSPADM

## 2024-04-11 RX ORDER — OXYCODONE HCL 5 MG/5 ML
5 SOLUTION, ORAL ORAL EVERY 4 HOURS PRN
Status: DISCONTINUED | OUTPATIENT
Start: 2024-04-11 | End: 2024-04-12 | Stop reason: HOSPADM

## 2024-04-11 RX ORDER — HYDROMORPHONE HCL IN WATER/PF 6 MG/30 ML
0.4 PATIENT CONTROLLED ANALGESIA SYRINGE INTRAVENOUS EVERY 5 MIN PRN
Status: DISCONTINUED | OUTPATIENT
Start: 2024-04-11 | End: 2024-04-11 | Stop reason: HOSPADM

## 2024-04-11 RX ORDER — TRANEXAMIC ACID 100 MG/ML
INJECTION, SOLUTION INTRAVENOUS PRN
Status: DISCONTINUED | OUTPATIENT
Start: 2024-04-11 | End: 2024-04-11

## 2024-04-11 RX ORDER — FENTANYL CITRATE 50 UG/ML
25 INJECTION, SOLUTION INTRAMUSCULAR; INTRAVENOUS EVERY 5 MIN PRN
Status: DISCONTINUED | OUTPATIENT
Start: 2024-04-11 | End: 2024-04-11 | Stop reason: HOSPADM

## 2024-04-11 RX ORDER — ONDANSETRON 2 MG/ML
4 INJECTION INTRAMUSCULAR; INTRAVENOUS EVERY 6 HOURS PRN
Status: DISCONTINUED | OUTPATIENT
Start: 2024-04-11 | End: 2024-04-12 | Stop reason: HOSPADM

## 2024-04-11 RX ORDER — CEFAZOLIN SODIUM/WATER 2 G/20 ML
2 SYRINGE (ML) INTRAVENOUS SEE ADMIN INSTRUCTIONS
Status: DISCONTINUED | OUTPATIENT
Start: 2024-04-11 | End: 2024-04-11 | Stop reason: HOSPADM

## 2024-04-11 RX ORDER — NALOXONE HYDROCHLORIDE 0.4 MG/ML
0.1 INJECTION, SOLUTION INTRAMUSCULAR; INTRAVENOUS; SUBCUTANEOUS
Status: DISCONTINUED | OUTPATIENT
Start: 2024-04-11 | End: 2024-04-11 | Stop reason: HOSPADM

## 2024-04-11 RX ORDER — ACETAMINOPHEN 650 MG/20.3ML
650 LIQUID ORAL EVERY 4 HOURS PRN
Status: DISCONTINUED | OUTPATIENT
Start: 2024-04-11 | End: 2024-04-11

## 2024-04-11 RX ORDER — ALBUTEROL SULFATE 90 UG/1
2 AEROSOL, METERED RESPIRATORY (INHALATION) EVERY 6 HOURS PRN
Status: DISCONTINUED | OUTPATIENT
Start: 2024-04-11 | End: 2024-04-12 | Stop reason: HOSPADM

## 2024-04-11 RX ORDER — CEFAZOLIN SODIUM 1 G/3ML
1 INJECTION, POWDER, FOR SOLUTION INTRAMUSCULAR; INTRAVENOUS EVERY 8 HOURS
Qty: 10 ML | Refills: 0 | Status: COMPLETED | OUTPATIENT
Start: 2024-04-11 | End: 2024-04-12

## 2024-04-11 RX ORDER — FENTANYL CITRATE 50 UG/ML
25-100 INJECTION, SOLUTION INTRAMUSCULAR; INTRAVENOUS
Status: DISCONTINUED | OUTPATIENT
Start: 2024-04-11 | End: 2024-04-11 | Stop reason: HOSPADM

## 2024-04-11 RX ORDER — ONDANSETRON 4 MG/1
4 TABLET, ORALLY DISINTEGRATING ORAL EVERY 30 MIN PRN
Status: DISCONTINUED | OUTPATIENT
Start: 2024-04-11 | End: 2024-04-11 | Stop reason: HOSPADM

## 2024-04-11 RX ORDER — ROSUVASTATIN CALCIUM 10 MG/1
20 TABLET, COATED ORAL DAILY
Status: DISCONTINUED | OUTPATIENT
Start: 2024-04-11 | End: 2024-04-12 | Stop reason: HOSPADM

## 2024-04-11 RX ORDER — LOSARTAN POTASSIUM 25 MG/1
25 TABLET ORAL EVERY MORNING
Status: DISCONTINUED | OUTPATIENT
Start: 2024-04-12 | End: 2024-04-12 | Stop reason: HOSPADM

## 2024-04-11 RX ORDER — POLYETHYLENE GLYCOL 3350 17 G/17G
17 POWDER, FOR SOLUTION ORAL DAILY
Status: DISCONTINUED | OUTPATIENT
Start: 2024-04-12 | End: 2024-04-12 | Stop reason: HOSPADM

## 2024-04-11 RX ORDER — MAGNESIUM HYDROXIDE 1200 MG/15ML
LIQUID ORAL PRN
Status: DISCONTINUED | OUTPATIENT
Start: 2024-04-11 | End: 2024-04-11 | Stop reason: HOSPADM

## 2024-04-11 RX ORDER — OXYCODONE HCL 5 MG/5 ML
10 SOLUTION, ORAL ORAL EVERY 4 HOURS PRN
Status: DISCONTINUED | OUTPATIENT
Start: 2024-04-11 | End: 2024-04-12 | Stop reason: HOSPADM

## 2024-04-11 RX ORDER — FENTANYL CITRATE 50 UG/ML
50 INJECTION, SOLUTION INTRAMUSCULAR; INTRAVENOUS EVERY 5 MIN PRN
Status: DISCONTINUED | OUTPATIENT
Start: 2024-04-11 | End: 2024-04-11 | Stop reason: HOSPADM

## 2024-04-11 RX ORDER — ENOXAPARIN SODIUM 100 MG/ML
40 INJECTION SUBCUTANEOUS EVERY 24 HOURS
Status: DISCONTINUED | OUTPATIENT
Start: 2024-04-12 | End: 2024-04-12 | Stop reason: HOSPADM

## 2024-04-11 RX ORDER — AMOXICILLIN 250 MG
1 CAPSULE ORAL 2 TIMES DAILY
Status: DISCONTINUED | OUTPATIENT
Start: 2024-04-11 | End: 2024-04-12 | Stop reason: HOSPADM

## 2024-04-11 RX ORDER — ONDANSETRON 4 MG/1
4 TABLET, ORALLY DISINTEGRATING ORAL EVERY 6 HOURS PRN
Status: DISCONTINUED | OUTPATIENT
Start: 2024-04-11 | End: 2024-04-11

## 2024-04-11 RX ORDER — LIDOCAINE 40 MG/G
CREAM TOPICAL
Status: DISCONTINUED | OUTPATIENT
Start: 2024-04-11 | End: 2024-04-11 | Stop reason: HOSPADM

## 2024-04-11 RX ORDER — SODIUM CHLORIDE, SODIUM LACTATE, POTASSIUM CHLORIDE, CALCIUM CHLORIDE 600; 310; 30; 20 MG/100ML; MG/100ML; MG/100ML; MG/100ML
INJECTION, SOLUTION INTRAVENOUS CONTINUOUS
Status: DISCONTINUED | OUTPATIENT
Start: 2024-04-11 | End: 2024-04-11 | Stop reason: HOSPADM

## 2024-04-11 RX ORDER — ONDANSETRON 2 MG/ML
4 INJECTION INTRAMUSCULAR; INTRAVENOUS EVERY 30 MIN PRN
Status: DISCONTINUED | OUTPATIENT
Start: 2024-04-11 | End: 2024-04-11 | Stop reason: HOSPADM

## 2024-04-11 RX ORDER — TIZANIDINE 2 MG/1
2 TABLET ORAL EVERY 6 HOURS PRN
Status: DISCONTINUED | OUTPATIENT
Start: 2024-04-11 | End: 2024-04-12 | Stop reason: HOSPADM

## 2024-04-11 RX ORDER — ACETAMINOPHEN 500 MG
500 TABLET ORAL EVERY 4 HOURS PRN
Status: DISCONTINUED | OUTPATIENT
Start: 2024-04-11 | End: 2024-04-12 | Stop reason: HOSPADM

## 2024-04-11 RX ORDER — KETOROLAC TROMETHAMINE 15 MG/ML
15 INJECTION, SOLUTION INTRAMUSCULAR; INTRAVENOUS EVERY 6 HOURS
Qty: 4 ML | Refills: 0 | Status: DISCONTINUED | OUTPATIENT
Start: 2024-04-11 | End: 2024-04-12 | Stop reason: HOSPADM

## 2024-04-11 RX ORDER — BUPIVACAINE HYDROCHLORIDE AND EPINEPHRINE 5; 5 MG/ML; UG/ML
INJECTION, SOLUTION PERINEURAL
Status: COMPLETED | OUTPATIENT
Start: 2024-04-11 | End: 2024-04-11

## 2024-04-11 RX ORDER — CETIRIZINE HYDROCHLORIDE 10 MG/1
10 TABLET ORAL DAILY PRN
Status: DISCONTINUED | OUTPATIENT
Start: 2024-04-11 | End: 2024-04-11

## 2024-04-11 RX ORDER — HYDROMORPHONE HCL IN WATER/PF 6 MG/30 ML
0.2 PATIENT CONTROLLED ANALGESIA SYRINGE INTRAVENOUS EVERY 5 MIN PRN
Status: DISCONTINUED | OUTPATIENT
Start: 2024-04-11 | End: 2024-04-11 | Stop reason: HOSPADM

## 2024-04-11 RX ORDER — SODIUM CHLORIDE, SODIUM LACTATE, POTASSIUM CHLORIDE, CALCIUM CHLORIDE 600; 310; 30; 20 MG/100ML; MG/100ML; MG/100ML; MG/100ML
INJECTION, SOLUTION INTRAVENOUS CONTINUOUS
Status: DISCONTINUED | OUTPATIENT
Start: 2024-04-11 | End: 2024-04-12 | Stop reason: HOSPADM

## 2024-04-11 RX ORDER — PROPOFOL 10 MG/ML
INJECTION, EMULSION INTRAVENOUS CONTINUOUS PRN
Status: DISCONTINUED | OUTPATIENT
Start: 2024-04-11 | End: 2024-04-11

## 2024-04-11 RX ORDER — BISACODYL 10 MG
10 SUPPOSITORY, RECTAL RECTAL DAILY PRN
Status: DISCONTINUED | OUTPATIENT
Start: 2024-04-11 | End: 2024-04-12 | Stop reason: HOSPADM

## 2024-04-11 RX ORDER — BUPIVACAINE HYDROCHLORIDE 7.5 MG/ML
INJECTION, SOLUTION INTRASPINAL
Status: COMPLETED | OUTPATIENT
Start: 2024-04-11 | End: 2024-04-11

## 2024-04-11 RX ORDER — FLUTICASONE PROPIONATE 50 MCG
2 SPRAY, SUSPENSION (ML) NASAL DAILY
Status: DISCONTINUED | OUTPATIENT
Start: 2024-04-12 | End: 2024-04-12 | Stop reason: HOSPADM

## 2024-04-11 RX ORDER — CEFAZOLIN SODIUM/WATER 2 G/20 ML
2 SYRINGE (ML) INTRAVENOUS
Status: COMPLETED | OUTPATIENT
Start: 2024-04-11 | End: 2024-04-11

## 2024-04-11 RX ORDER — FLUTICASONE PROPIONATE AND SALMETEROL 100; 50 UG/1; UG/1
1 POWDER RESPIRATORY (INHALATION) 2 TIMES DAILY
Status: DISCONTINUED | OUTPATIENT
Start: 2024-04-11 | End: 2024-04-12 | Stop reason: HOSPADM

## 2024-04-11 RX ORDER — ROSUVASTATIN CALCIUM 10 MG/1
20 TABLET, COATED ORAL DAILY
Status: DISCONTINUED | OUTPATIENT
Start: 2024-04-11 | End: 2024-04-11

## 2024-04-11 RX ORDER — LOSARTAN POTASSIUM 25 MG/1
25 TABLET ORAL EVERY MORNING
Status: DISCONTINUED | OUTPATIENT
Start: 2024-04-12 | End: 2024-04-11

## 2024-04-11 RX ORDER — CALCIUM CARBONATE 500 MG/1
500 TABLET, CHEWABLE ORAL 4 TIMES DAILY PRN
Status: DISCONTINUED | OUTPATIENT
Start: 2024-04-11 | End: 2024-04-11

## 2024-04-11 RX ADMIN — SODIUM CHLORIDE, POTASSIUM CHLORIDE, SODIUM LACTATE AND CALCIUM CHLORIDE: 600; 310; 30; 20 INJECTION, SOLUTION INTRAVENOUS at 20:36

## 2024-04-11 RX ADMIN — BUPIVACAINE HYDROCHLORIDE AND EPINEPHRINE BITARTRATE 15 ML: 5; .005 INJECTION, SOLUTION PERINEURAL at 14:32

## 2024-04-11 RX ADMIN — Medication 2 G: at 15:29

## 2024-04-11 RX ADMIN — KETOROLAC TROMETHAMINE 15 MG: 15 INJECTION, SOLUTION INTRAMUSCULAR; INTRAVENOUS at 22:45

## 2024-04-11 RX ADMIN — PHENYLEPHRINE HYDROCHLORIDE 50 MCG: 10 INJECTION INTRAVENOUS at 16:28

## 2024-04-11 RX ADMIN — MIDAZOLAM 2 MG: 1 INJECTION INTRAMUSCULAR; INTRAVENOUS at 14:32

## 2024-04-11 RX ADMIN — FENTANYL CITRATE 100 MCG: 50 INJECTION, SOLUTION INTRAMUSCULAR; INTRAVENOUS at 14:32

## 2024-04-11 RX ADMIN — OXYCODONE HYDROCHLORIDE 5 MG: 5 SOLUTION ORAL at 20:36

## 2024-04-11 RX ADMIN — SODIUM CHLORIDE, POTASSIUM CHLORIDE, SODIUM LACTATE AND CALCIUM CHLORIDE: 600; 310; 30; 20 INJECTION, SOLUTION INTRAVENOUS at 14:27

## 2024-04-11 RX ADMIN — TRANEXAMIC ACID 1 G: 1 INJECTION, SOLUTION INTRAVENOUS at 16:58

## 2024-04-11 RX ADMIN — TRANEXAMIC ACID 1 G: 1 INJECTION, SOLUTION INTRAVENOUS at 15:40

## 2024-04-11 RX ADMIN — MIDAZOLAM 2 MG: 1 INJECTION INTRAMUSCULAR; INTRAVENOUS at 15:23

## 2024-04-11 RX ADMIN — CEFAZOLIN 1 G: 1 INJECTION, POWDER, FOR SOLUTION INTRAMUSCULAR; INTRAVENOUS at 23:15

## 2024-04-11 RX ADMIN — ROSUVASTATIN CALCIUM 20 MG: 10 TABLET, COATED ORAL at 23:18

## 2024-04-11 RX ADMIN — BUPIVACAINE HYDROCHLORIDE IN DEXTROSE 1.6 ML: 7.5 INJECTION, SOLUTION SUBARACHNOID at 15:28

## 2024-04-11 RX ADMIN — PROPOFOL 125 MCG/KG/MIN: 10 INJECTION, EMULSION INTRAVENOUS at 15:25

## 2024-04-11 ASSESSMENT — ACTIVITIES OF DAILY LIVING (ADL)
ADLS_ACUITY_SCORE: 33
ADLS_ACUITY_SCORE: 37
ADLS_ACUITY_SCORE: 33
ADLS_ACUITY_SCORE: 37
ADLS_ACUITY_SCORE: 33
ADLS_ACUITY_SCORE: 37
ADLS_ACUITY_SCORE: 33
ADLS_ACUITY_SCORE: 33
ADLS_ACUITY_SCORE: 37
ADLS_ACUITY_SCORE: 37
ADLS_ACUITY_SCORE: 33

## 2024-04-11 NOTE — ANESTHESIA PREPROCEDURE EVALUATION
Anesthesia Pre-Procedure Evaluation    Patient: Zulema Carrillo   MRN: [id number] : 1955        Procedure : Procedure(s):  LEFT TOTAL KNEE ARTHROPLASTY          Past Medical History:   Diagnosis Date    Anxiety     CAD (coronary artery disease)     Depression     Gastroesophageal reflux disease     Gastroesophageal reflux disease with esophagitis     Heart murmur     HLD (hyperlipidemia)     HTN (hypertension)     Melanoma of skin (H)     Migraine     MVP (mitral valve prolapse)     CHELSEA (obstructive sleep apnea)     CPAP    Osteoarthritis     PONV (postoperative nausea and vomiting)     Reactive airway disease     Sjogren's syndrome (H24)       Past Surgical History:   Procedure Laterality Date    AS TOTAL KNEE ARTHROPLASTY      BREAST SURGERY      HYSTERECTOMY      MASTECTOMY      MENISCECTOMY      ORTHOPEDIC SURGERY      REVISE RECONSTRUCTED BREAST BILATERAL Bilateral 10/03/2022    Procedure: BILATERAL BREAST IMPLANT REMOVAL AND TOTAL PERIPROSTHETIC CAPSULECTOMIES, BILATERAL INFERIOR DERMAL FLAPS;  Surgeon: Nell Pearl MD;  Location: [facility] OR    SALPINGO-OOPHORECTOMY BILATERAL        Allergies   Allergen Reactions    Corn-Containing Products Headache, Hives and Difficulty breathing     Congested and agitated  Corn Starch is most severe allergy    Diphenhydramine Hives, Itching and Rash    Prednisone Hives and Rash    Aspirin Hives    Glucosamine Itching    Hydrocodone-Acetaminophen Nausea    Ibuprofen Hives    Iodine Itching     Redness    Lisinopril Cough    Morphine Hives    Nickel     Shellfish-Derived Products Hives    Amoxicillin-Pot Clavulanate Rash    Benzonatate Anxiety    Cephalexin Rash    Ciprofloxacin Rash    Hydromorphone Rash     itching    Zinc Acetate Rash      Social History     Tobacco Use    Smoking status: Never    Smokeless tobacco: Never   Substance Use Topics    Alcohol use: Yes     Comment: OCCASIONAL      Wt Readings from Last 1 Encounters:   24 78 kg (172 lb)     "    Anesthesia Evaluation            ROS/MED HX  ENT/Pulmonary:  - neg pulmonary ROS   (+) sleep apnea,                                       Neurologic:  - neg neurologic ROS     Cardiovascular:  - neg cardiovascular ROS   (+)  hypertension- -  CAD -  - -                           valvular problems/murmurs           METS/Exercise Tolerance: >4 METS    Hematologic:  - neg hematologic  ROS     Musculoskeletal:  - neg musculoskeletal ROS     GI/Hepatic:  - neg GI/hepatic ROS   (+) GERD,                   Renal/Genitourinary:  - neg Renal ROS     Endo:  - neg endo ROS     Psychiatric/Substance Use:  - neg psychiatric ROS     Infectious Disease:  - neg infectious disease ROS     Malignancy:  - neg malignancy ROS     Other:  - neg other ROS          Physical Exam    Airway        Mallampati: II    Neck ROM: full     Respiratory Devices and Support         Dental           Cardiovascular   cardiovascular exam normal          Pulmonary   pulmonary exam normal                OUTSIDE LABS:  CBC:   Lab Results   Component Value Date    WBC 4.1 06/17/2017    WBC 6.7 06/14/2017    HGB 9.7 (L) 06/17/2017    HGB 10.8 (L) 06/14/2017    HCT 30.4 (L) 06/17/2017    HCT 32.7 (L) 06/14/2017     06/17/2017     06/14/2017     BMP:   Lab Results   Component Value Date     09/26/2022     06/17/2017    POTASSIUM 3.6 09/26/2022    POTASSIUM 3.3 (L) 06/17/2017    CHLORIDE 108 (H) 09/26/2022    CHLORIDE 112 (H) 06/17/2017    CO2 25 09/26/2022    CO2 23 06/17/2017    BUN 13.1 09/26/2022    BUN 8 06/17/2017    CR 0.76 09/26/2022    CR 0.76 06/17/2017     (H) 09/26/2022    GLC 99 06/17/2017     COAGS: No results found for: \"PTT\", \"INR\", \"FIBR\"  POC: No results found for: \"BGM\", \"HCG\", \"HCGS\"  HEPATIC:   Lab Results   Component Value Date    ALT 19 10/24/2023     OTHER:   Lab Results   Component Value Date    JUNE 9.4 09/26/2022       Anesthesia Plan    ASA Status:  3       Anesthesia Type: Spinal.          " "    Consents    Anesthesia Plan(s) and associated risks, benefits, and realistic alternatives discussed. Questions answered and patient/representative(s) expressed understanding.     - Discussed:     - Discussed with:  Patient            Postoperative Care            Comments:               Leland Curtis MD    I have reviewed the pertinent notes and labs in the chart from the past 30 days and (re)examined the patient.  Any updates or changes from those notes are reflected in this note.              # Overweight: Estimated body mass index is 26.94 kg/m  as calculated from the following:    Height as of this encounter: 1.702 m (5' 7\").    Weight as of this encounter: 78 kg (172 lb).      "

## 2024-04-11 NOTE — PHARMACY-ADMISSION MEDICATION HISTORY
Pharmacist Admission Medication History    Admission medication history is complete. The information provided in this note is only as accurate as the sources available at the time of the update.    Information Source(s): Patient and CareEverywhere/SureScripts via in-person    Pertinent Information: Patient brought home medications due to corn allergy      Allergies reviewed with patient and updates made in EHR: yes    Medication History Completed By: Judith Chua RPH 4/11/2024 2:28 PM    PTA Med List   Medication Sig Last Dose    albuterol (PROAIR HFA/PROVENTIL HFA/VENTOLIN HFA) 108 (90 Base) MCG/ACT inhaler Inhale 2 puffs into the lungs every 6 hours as needed Pt brought    ALPRAZolam (XANAX PO) Take 0.5 mg by mouth 3 times daily as needed for anxiety More than a month    cetirizine (ZYRTEC) 10 MG tablet Take 10 mg by mouth daily as needed for allergies Unknown    EPINEPHrine (ANY BX GENERIC EQUIV) 0.3 MG/0.3ML injection 2-pack Inject 0.3 mg into the muscle as needed for anaphylaxis May repeat one time in 5-15 minutes if response to initial dose is inadequate. Unknown    fluticasone-salmeterol (ADVAIR) 100-50 MCG/ACT inhaler Inhale 1 puff into the lungs 2 times daily 4/11/2024 at AM, Pt brought    Losartan Potassium (COZAAR PO) Take 25 mg by mouth every morning  4/10/2024 at AM    multivitamin, therapeutic with minerals (THERA-VIT-M) TABS tablet Take 1 tablet by mouth daily 4/1/2024    NONFORMULARY Take 1 tablet by mouth daily ferrasorb 4/10/2024 at AM    rosuvastatin (CRESTOR) 20 MG tablet Take 1 tablet (20 mg) by mouth daily 4/10/2024 at PM    sertraline (ZOLOFT) 100 MG tablet Take 150 mg by mouth every morning 4/11/2024 at AM    triamcinolone (NASACORT ALLERGY 24HR) 55 MCG/ACT nasal aerosol Spray 2 sprays into both nostrils daily as needed

## 2024-04-11 NOTE — TREATMENT PLAN
Orthopedic Surgery Pre-Op Plan: Zulema Carrillo  pre-op review. This is NOT an H&P   Surgeon: Dr. Lombardi    Hospital: [facility]  Name of Surgery: Left Total Knee Arthroplasty  Date of Surgery: 4/11/24  H&P: Completed on 3/25/24 by Dr. Ana Paula Jett at [facility].   History of ASA, NSAIDS, vitamin and/or herbal supplements, GLP-1 Agonist or SGLT Inhibitor medication taken within 10 days?: Yes- multivitamin-patient instructed to hold this for 7 days before surgery.  History of blood thinners?: No     Plan:   1) Discharge Plan: Planning to discharge home on POD 1 in the morning with assist of Family.  Please see Discharge Planning section near bottom of this note for further details.     2) Multiple Allergies: patient has multiple allergies. In particular, she has allergy to corn and corn fillers that are in many oral medications in pill form. Our Ortho Navigator/Ortho Education RN worked with patient and [facility] Pharmacist on this. Pharmacist gave permission for patient to bring in her own medications for use in hospital. Patient reports she is usually able to tolerate the oral liquid forms of many medications. Plan is to use oral liquid form of oxycodone and Pharmacist also recommends that IV TXA be used for this patient rather than the usual oral TXA. I also checked with Anesthesia. Since most of their medications would be given in IV or injection form, they did not anticipate any issues with patient proceeding from their standpoint.     3) PONV: Reports history of nausea/vomiting with anesthesia.  Recommend patient discusses this with preop nursing and Anesthesia on day of surgery.  Would likely benefit from antiemetics and other measures to prevent or minimize nausea/vomiting.    4) Dyspnea on Exertion: Chronic: Reviewed last Cardiology visit note with RAUL Baldwin, CNP, at [facility]: patient with history of dyspnea on exertion for several years.  Underwent evaluation for this in 2021.  Exercise nuclear stress test 7/2021 did not show any ischemia or infarction.  Echo 7/20/2021 showed LVEF 60-65% without regional wall motion abnormalities, trace MR, no evidence of mitral valve prolapse, normal pulmonary pressures.  Denies any chest pain, palpitations, orthopnea, or peripheral edema.  Functional capacity >4 METS.  Reports that dyspnea improves after using her inhaler. Appears stable from cardiovascular standpoint.  Will continue to follow up yearly with Cardiology.     5) Coronary Artery Calcification: CT coronary calcium score on 7/13/21 was 10.3 placing the patient in the 50-75th percentile when compared to age and gender matched control group.  Will continue medical management of risk factors.  On statin.    6) Hyperlipidemia: On rosuvastatin.    7) Hypertension: Well-controlled on losartan.  Patient was instructed to hold losartan on the morning of surgery.    8) Obstructive Sleep Apnea: On CPAP.  Patient reminded to bring CPAP machine to the hospital and use it whenever sleeping or napping.    9) Reactive Airway Disease: Denies any recent exacerbations.  On albuterol inhaler as needed.    10) Prediabetes: Last hemoglobin A1c 5.8 on 11/1/2023.  Blood glucose 103 on 3/25/2024. We will monitor BG's during hospital stay.  Goal BG <180 to decrease risk for infection and postop wound healing complications.  If BG >180, nursing to please notify Hospitalist.      11) Anxiety: On sertraline.    Patient appears medically optimized for upcoming surgery. I would recommend Hospitalist Consult to assist with medical management. Please call me below with any questions on this patient.       Review of Systems Notable for: Multiple allergies, PONV, dyspnea on exertion-chronic, coronary artery calcification, hyperlipidemia, hypertension, obstructive sleep apnea-on CPAP, reactive airway disease, prediabetes, anxiety.    Past Medical History:   Past Medical History:    Diagnosis Date    Anxiety     CAD (coronary artery disease)     Depression     Gastroesophageal reflux disease     Gastroesophageal reflux disease with esophagitis     Heart murmur     HLD (hyperlipidemia)     HTN (hypertension)     Melanoma of skin (H)     Migraine     MVP (mitral valve prolapse)     CHELSEA (obstructive sleep apnea)     CPAP    Osteoarthritis     PONV (postoperative nausea and vomiting)     Reactive airway disease     Sjogren's syndrome (H24)      Past Surgical History:   Procedure Laterality Date    AS TOTAL KNEE ARTHROPLASTY      BREAST SURGERY      HYSTERECTOMY      MASTECTOMY      MENISCECTOMY      ORTHOPEDIC SURGERY      REVISE RECONSTRUCTED BREAST BILATERAL Bilateral 10/03/2022    Procedure: BILATERAL BREAST IMPLANT REMOVAL AND TOTAL PERIPROSTHETIC CAPSULECTOMIES, BILATERAL INFERIOR DERMAL FLAPS;  Surgeon: Nell Pearl MD;  Location: [facility] OR    SALPINGO-OOPHORECTOMY BILATERAL         Current Medications:  Patient's Medications   New Prescriptions    No medications on file   Previous Medications    ALBUTEROL (PROAIR HFA/PROVENTIL HFA/VENTOLIN HFA) 108 (90 BASE) MCG/ACT INHALER    Inhale 2 puffs into the lungs every 6 hours    ALPRAZOLAM (XANAX PO)    Take 0.5 mg by mouth 3 times daily as needed for anxiety    CETIRIZINE (ZYRTEC) 10 MG TABLET    Take 10 mg by mouth daily as needed for allergies    EPINEPHRINE (ANY BX GENERIC EQUIV) 0.3 MG/0.3ML INJECTION 2-PACK    Inject 0.3 mg into the muscle as needed for anaphylaxis May repeat one time in 5-15 minutes if response to initial dose is inadequate.    FLUTICASONE-SALMETEROL (ADVAIR) 100-50 MCG/ACT INHALER    Inhale 1 puff into the lungs 2 times daily    LOSARTAN POTASSIUM (COZAAR PO)    Take 25 mg by mouth every morning     MULTIVITAMIN, THERAPEUTIC WITH MINERALS (THERA-VIT-M) TABS TABLET    Take 1 tablet by mouth daily    NONFORMULARY    Take 1 tablet by mouth daily ferrasorb    ROSUVASTATIN (CRESTOR) 20 MG TABLET    Take 1 tablet (20 mg) by  mouth daily    SERTRALINE (ZOLOFT) 100 MG TABLET    Take 150 mg by mouth every morning    SERTRALINE (ZOLOFT) 50 MG TABLET    Take 50 mg by mouth daily    TRIAMCINOLONE (NASACORT ALLERGY 24HR) 55 MCG/ACT NASAL AEROSOL    Spray 2 sprays into both nostrils daily as needed   Modified Medications    No medications on file   Discontinued Medications    VITAMIN D, CHOLECALCIFEROL, PO    Take 2,000 Units by mouth every morning       ALLERGIES:  Allergies   Allergen Reactions    Corn-Containing Products Headache, Hives and Difficulty breathing     Congested and agitated  Corn Starch is most severe allergy    Diphenhydramine Hives, Itching and Rash    Prednisone Hives and Rash    Aspirin Hives    Glucosamine Itching    Hydrocodone-Acetaminophen Nausea    Ibuprofen Hives    Iodine Itching     Redness    Lisinopril Cough    Morphine Hives    Nickel     Shellfish-Derived Products Hives    Amoxicillin-Pot Clavulanate Rash    Benzonatate Anxiety    Cephalexin Rash    Ciprofloxacin Rash    Hydromorphone Rash     itching    Zinc Acetate Rash       Social History  Social History     Tobacco Use    Smoking status: Never    Smokeless tobacco: Never   Vaping Use    Vaping status: Never Used   Substance Use Topics    Alcohol use: Yes     Comment: OCCASIONAL    Drug use: Never       Any Abnormal Recent Diagnostics? Yes  Hemoglobin 11.7 and platelet count 130,000 on 3/25/2024: Shows very mild anemia and thrombocytopenia.  Dr. Lombardi notified and is okay proceeding.  Hemoglobin A1c 5.8 on 11/1/2023: In prediabetes range.  Blood glucose 103 on 3/25/2024: We will monitor BG's during hospital stay.  Goal BG <180.    Discharge Planning:   Planning to discharge home on POD 1 in the morning with assist of Family.         04/05/24 1002   Discharge Planning   Patient/Family Anticipates Transition to home with family   Living Arrangements   Type of Residence Private Residence   Once home, are you able to live on one level? No   Equipment Currently  Used at Home raised toilet seat   Support System   Support Systems Spouse/Significant Other   Do you have someone available to stay with you one or two nights once you are home? Yes       RAUL Acuna, CNP   Advanced Practice Nurse Navigator- Orthopedics  [facility]   Phone: [phone]

## 2024-04-11 NOTE — ANESTHESIA CARE TRANSFER NOTE
Patient: Zulema Carrillo    Procedure: Procedure(s):  LEFT TOTAL KNEE ARTHROPLASTY       Diagnosis: Osteoarthritis of left knee [M17.12]  Diagnosis Additional Information: No value filed.    Anesthesia Type:   Spinal     Note:    Oropharynx: oropharynx clear of all foreign objects  Level of Consciousness: awake  Oxygen Supplementation: face mask  Level of Supplemental Oxygen (L/min / FiO2): 10  Independent Airway: airway patency satisfactory and stable  Dentition: dentition unchanged  Vital Signs Stable: post-procedure vital signs reviewed and stable  Report to RN Given: handoff report given  Patient transferred to: PACU    Handoff Report: Identifed the Patient, Identified the Reponsible Provider, Reviewed the pertinent medical history, Discussed the surgical course, Reviewed Intra-OP anesthesia mangement and issues during anesthesia, Set expectations for post-procedure period and Allowed opportunity for questions and acknowledgement of understanding      Vitals:  Vitals Value Taken Time   /69 04/11/24 1718   Temp 37.3  C (99.2  F) 04/11/24 1718   Pulse 71 04/11/24 1718   Resp 14 04/11/24 1718   SpO2 100 % 04/11/24 1718       Electronically Signed By: RAUL Gong CRNA  April 11, 2024  5:20 PM

## 2024-04-11 NOTE — ANESTHESIA PROCEDURE NOTES
"Intrathecal injection Procedure Note    Pre-Procedure   Staff -        Anesthesiologist:  Leland Curtis MD       Performed By: anesthesiologist       Location: OR       Procedure Start/Stop Times: 4/11/2024 3:28 PM and 4/11/2024 3:33 PM       Pre-Anesthestic Checklist: patient identified, IV checked, risks and benefits discussed, informed consent, monitors and equipment checked, pre-op evaluation, at physician/surgeon's request and post-op pain management  Timeout:       Correct Patient: Yes        Correct Procedure: Yes        Correct Site: Yes        Correct Position: Yes   Procedure Documentation  Procedure: intrathecal injection       Patient Position: sitting       Patient Prep/Sterile Barriers: sterile gloves, mask, patient draped       Skin prep: Chloraprep       Insertion Site: L3-4. (midline approach).       Needle Gauge: 25.        Needle Length (Inches): 4        Spinal Needle Type: Pencan       Introducer used       # of attempts: 1 and  # of redirects:     Assessment/Narrative         Paresthesias: No.       CSF fluid: clear.    Medication(s) Administered   0.75% Hyperbaric Bupivacaine (Intrathecal) - Intrathecal   1.6 mL - 4/11/2024 3:28:00 PM  Medication Administration Time: 4/11/2024 3:28 PM      FOR [facility] ([facility]) ONLY:   Pain Team Contact information: please page the Pain Team Via [employer]. Search \"Pain\". During daytime hours, please page the attending first. At night please page the resident first.      "

## 2024-04-11 NOTE — OP NOTE
Operative Note      PROCEDURE  LEFT MINIMALLY INVASIVE TOTAL KNEE ARTHROPLASTY     Pre-Procedure Diagnosis:  PRIMARY OSTEOARTHRITIS OF LEFT KNEE    Post-Procedure Diagnosis:    PRIMARY OSTEOARTHRITIS OF LEFT KNEE    Surgeon(s):  MD Sony Samson PA-C  A PAC was necessary to ensure safety of this patient and adequate progression of the procedure.    Anesthesia Type:  Spinal    Complications:  None    Condition on discharge from OR:  Stable    Estimated Blood Loss:   50 cc    Specimens:    None       Drains:   None    Implants:  5N LEFT LEGION CR OXINIUM femur, 3 LEFT tibia, 12 mm CR HF XLPE insert, and 29 mm patella ([employer] Legion knee)    Description:  After adequate anesthesia the left knee was prepped and draped in the usual sterile fashion.  We proceeded with an MIS midline incision through the skin and subcutaneous tissue and performed a midvastus approach.  The patella was everted and 10 mm of patellar bone was resected.  The knee was flexed, we identified Whitesides line and drilled the 6 degree intramedullary alignment guide.  We made a cut onto the anterior cortex and then adistal femoral cut of 9 mm.  I then sized the knee at a 5 and made the anterior, posterior, and champfer cuts.  I turned attention to the tibia.  The extramedullary guide was utilized to alignment and the tibial plateau cut was completed without difficulty.  A laminar  was placed and the posterior elements of the medial and lateral menisci and femoral osteophytes were removed.  I then injected the capsule with ropivicaine, morphine, toradol, and epi.  I trialed a 5N femur, a 3 tibia, and an 13 mm CR HF insert and while it was slightly tight, the 11 mm insert was slightly lax.  Alignment and soft tissue balance felt and looked excellent, so we marked and punched the components.  We then used pulsavac lavage to wash the bony surfaces, we dried the bony surfaces and then cemented the tibia, the femur, and  the patella in that order.  With the components cemented in place and all excess cement removed, we let the cement cure.  I put the tourniquet down and gained hemostasis.  Once the cement was cured, I locked the 12 mm CR HF XLPE insert and irrigated again.  We closed the arthrotomy with #1 ethibond and the VMO fascia with #1 vicryl.  The subQ was closed with 2.0 vicryl and the skin with staples.  Sterile bandages were applied and the patient was returned to the PACU in excellent condition.    Plan:  WBAT   F/U in 2 weeks   Leave Aquacel for 10 days   DVT Prophylaxis: Lovenox 40 mg subcutaneous daily for 28 days.   Liquid pain meds

## 2024-04-11 NOTE — ANESTHESIA PROCEDURE NOTES
"Adductor canal Procedure Note    Pre-Procedure   Staff -        Anesthesiologist:  Leland Curtis MD       Performed By: anesthesiologist       Location: pre-op       Procedure Start/Stop Times: 4/11/2024 2:32 PM and 4/11/2024 2:37 PM       Pre-Anesthestic Checklist: patient identified, IV checked, site marked, risks and benefits discussed, informed consent, monitors and equipment checked, pre-op evaluation, at physician/surgeon's request and post-op pain management  Timeout:       Correct Patient: Yes        Correct Procedure: Yes        Correct Site: Yes        Correct Position: Yes        Correct Laterality: Yes        Site Marked: Yes  Procedure Documentation  Procedure: Adductor canal       Patient Position: supine       Skin prep: Chloraprep       Needle Type: insulated       Needle Gauge: 20.        Needle Length (Inches): 6        1. Ultrasound was used to identify targeted nerve, plexus, vascular marker, or fascial plane and place a needle adjacent to it in real-time.       2. Ultrasound was used to visualize the spread of anesthetic in close proximity to the above referenced structure.       3. A permanent image is entered into the patient's record.       4. The visualized anatomic structures appeared normal.       5. There were no apparent abnormal pathologic findings.    Assessment/Narrative         The placement was negative for: blood aspirated, painful injection and site bleeding       Paresthesias: No.       Injection made incrementally with aspirations every 5 mL.    Medication(s) Administered   Bupivacaine 0.5% w/ 1:200K Epi (Other) - Other   15 mL - 4/11/2024 2:32:00 PM  Medication Administration Time: 4/11/2024 2:32 PM      FOR [facility] ([facility]) ONLY:   Pain Team Contact information: please page the Pain Team Via [employer]. Search \"Pain\". During daytime hours, please page the attending first. At night please page the resident first.      "

## 2024-04-12 ENCOUNTER — APPOINTMENT (OUTPATIENT)
Dept: PHYSICAL THERAPY | Facility: CLINIC | Age: 69
End: 2024-04-12
Attending: ORTHOPAEDIC SURGERY
Payer: COMMERCIAL

## 2024-04-12 VITALS
SYSTOLIC BLOOD PRESSURE: 135 MMHG | DIASTOLIC BLOOD PRESSURE: 68 MMHG | BODY MASS INDEX: 27 KG/M2 | OXYGEN SATURATION: 93 % | RESPIRATION RATE: 16 BRPM | HEART RATE: 73 BPM | WEIGHT: 172 LBS | HEIGHT: 67 IN | TEMPERATURE: 98.4 F

## 2024-04-12 LAB
FASTING STATUS PATIENT QL REPORTED: YES
GLUCOSE SERPL-MCNC: 114 MG/DL (ref 70–99)
HGB BLD-MCNC: 10 G/DL (ref 11.7–15.7)
PLATELET # BLD AUTO: 111 10E3/UL (ref 150–450)

## 2024-04-12 PROCEDURE — 97116 GAIT TRAINING THERAPY: CPT | Mod: GP

## 2024-04-12 PROCEDURE — 999N000111 HC STATISTIC OT IP EVAL DEFER

## 2024-04-12 PROCEDURE — 36415 COLL VENOUS BLD VENIPUNCTURE: CPT | Performed by: ORTHOPAEDIC SURGERY

## 2024-04-12 PROCEDURE — 85049 AUTOMATED PLATELET COUNT: CPT | Performed by: ORTHOPAEDIC SURGERY

## 2024-04-12 PROCEDURE — 97161 PT EVAL LOW COMPLEX 20 MIN: CPT | Mod: GP

## 2024-04-12 PROCEDURE — 250N000013 HC RX MED GY IP 250 OP 250 PS 637: Performed by: ORTHOPAEDIC SURGERY

## 2024-04-12 PROCEDURE — 82947 ASSAY GLUCOSE BLOOD QUANT: CPT | Performed by: ORTHOPAEDIC SURGERY

## 2024-04-12 PROCEDURE — 250N000011 HC RX IP 250 OP 636: Performed by: ORTHOPAEDIC SURGERY

## 2024-04-12 PROCEDURE — 99207 PR NO BILLABLE SERVICE THIS VISIT: CPT | Performed by: STUDENT IN AN ORGANIZED HEALTH CARE EDUCATION/TRAINING PROGRAM

## 2024-04-12 PROCEDURE — 97110 THERAPEUTIC EXERCISES: CPT | Mod: GP

## 2024-04-12 PROCEDURE — 85018 HEMOGLOBIN: CPT | Performed by: ORTHOPAEDIC SURGERY

## 2024-04-12 RX ORDER — ENOXAPARIN SODIUM 100 MG/ML
40 INJECTION SUBCUTANEOUS EVERY 24 HOURS
Qty: 11.2 ML | Refills: 0 | Status: SHIPPED | OUTPATIENT
Start: 2024-04-12 | End: 2024-05-10

## 2024-04-12 RX ORDER — OXYCODONE HCL 5 MG/5 ML
5 SOLUTION, ORAL ORAL EVERY 4 HOURS PRN
Qty: 150 ML | Refills: 0 | Status: SHIPPED | OUTPATIENT
Start: 2024-04-12

## 2024-04-12 RX ORDER — TIZANIDINE 2 MG/1
2 TABLET ORAL EVERY 6 HOURS PRN
Qty: 25 TABLET | Refills: 0 | Status: SHIPPED | OUTPATIENT
Start: 2024-04-12

## 2024-04-12 RX ORDER — ENOXAPARIN SODIUM 100 MG/ML
40 INJECTION SUBCUTANEOUS EVERY 24 HOURS
Qty: 16 ML | Refills: 0 | Status: SHIPPED | OUTPATIENT
Start: 2024-04-12 | End: 2024-04-12

## 2024-04-12 RX ADMIN — KETOROLAC TROMETHAMINE 15 MG: 15 INJECTION, SOLUTION INTRAMUSCULAR; INTRAVENOUS at 04:55

## 2024-04-12 RX ADMIN — OXYCODONE HYDROCHLORIDE 10 MG: 5 SOLUTION ORAL at 06:33

## 2024-04-12 RX ADMIN — OXYCODONE HYDROCHLORIDE 10 MG: 5 SOLUTION ORAL at 00:21

## 2024-04-12 RX ADMIN — KETOROLAC TROMETHAMINE 15 MG: 15 INJECTION, SOLUTION INTRAMUSCULAR; INTRAVENOUS at 09:33

## 2024-04-12 RX ADMIN — OXYCODONE HYDROCHLORIDE 10 MG: 5 SOLUTION ORAL at 10:57

## 2024-04-12 RX ADMIN — LOSARTAN POTASSIUM 25 MG: 25 TABLET ORAL at 06:36

## 2024-04-12 RX ADMIN — CEFAZOLIN 1 G: 1 INJECTION, POWDER, FOR SOLUTION INTRAMUSCULAR; INTRAVENOUS at 06:40

## 2024-04-12 ASSESSMENT — ACTIVITIES OF DAILY LIVING (ADL)
ADLS_ACUITY_SCORE: 41
ADLS_ACUITY_SCORE: 38
ADLS_ACUITY_SCORE: 41
ADLS_ACUITY_SCORE: 41
ADLS_ACUITY_SCORE: 38
ADLS_ACUITY_SCORE: 36
ADLS_ACUITY_SCORE: 38
ADLS_ACUITY_SCORE: 41
ADLS_ACUITY_SCORE: 36
ADLS_ACUITY_SCORE: 38

## 2024-04-12 NOTE — PROGRESS NOTES
Brief progress note:    Reviewed nursing notes, vitals, labs. Patient is medically stable for discharge. Hospitalist portion of med rec complete. HMS will sign off, please feel free to reach out with any questions or concerns.    Chaim Rowe MD  Hospitalist

## 2024-04-12 NOTE — DISCHARGE SUMMARY
[facility] Discharge Summary                                  [facility]     ALEX CONTRERAS [id number]   Age: 68 year old  PCP: Ana Paula Jett, [phone] 1955     Date of Admission:  4/11/2024  Date of Discharge::  4/12/2024  Discharge Provider:  RAUL Santillan CNP    Code status:  Full Code    Admission Information:  Admission Diagnosis:  Osteoarthritis of left knee [M17.12]    Post-Operative Day: 1 Day Post-Op     Reason for admission:  The patient was admitted for the following:Procedure(s) (LRB):  LEFT TOTAL KNEE ARTHROPLASTY (Left)    Active Problems:    Primary osteoarthritis of left knee      Allergies:  Corn-containing products, Diphenhydramine, Prednisone, Aspirin, Glucosamine, Hydrocodone-acetaminophen, Ibuprofen, Iodine, Lisinopril, Morphine, Nickel, Shellfish-derived products, Amoxicillin-pot clavulanate, Benzonatate, Cephalexin, Ciprofloxacin, Hydromorphone, and Zinc acetate    Following the procedure noted above the patient was transferred to the post-op floor and started on:    Therapy:  physical therapy and occupational therapy  Anticoagulation Plan: Lovenox 40 mg daily  for 28 days  Pain Management: scopainmedication: oxycodone and tylenol  Weight bearing status: Weight bearing as tolerated     The patient was followed by Orthopedics during the inpatient treatment course:  Complications:  None  Additional consultations:  Hospitalist      Pertinent Labs   Lab Results: personally reviewed.     Recent Labs   Lab Test 04/12/24  0615 09/26/22  0823 06/17/17  0640 06/14/17  1706   WBC  --   --  4.1 6.7   HGB 10.0*  --  9.7* 10.8*   HCT  --   --  30.4* 32.7*   MCV  --   --  81 79   *  --  181 156   NA  --  140 142 130*          Discharge Information:  Condition at discharge: Stable  Discharge destination:  Discharged to home     Medications at discharge:  Discharge Medication List as of 4/12/2024 12:19 PM        START taking these medications     Details   oxyCODONE (ROXICODONE) 5 MG/5ML solution Take 5 mLs (5 mg) by mouth every 4 hours as needed for moderate pain, Disp-150 mL, R-0, E-Prescribe      tiZANidine (ZANAFLEX) 2 MG tablet Take 1 tablet (2 mg) by mouth every 6 hours as needed for muscle spasms, Disp-25 tablet, R-0, E-PrescribeOnly able to tolerate Doctor Ready due to allergy to corn.      enoxaparin ANTICOAGULANT (LOVENOX) 40 MG/0.4ML syringe Inject 0.4 mLs (40 mg) Subcutaneous every 24 hours for 40 days, Disp-16 mL, R-0, E-Prescribe           CONTINUE these medications which have NOT CHANGED    Details   albuterol (PROAIR HFA/PROVENTIL HFA/VENTOLIN HFA) 108 (90 Base) MCG/ACT inhaler Inhale 2 puffs into the lungs every 6 hours as needed, HistoricalPharmacy may dispense brand covered by insurance (Proair, or proventil or ventolin or generic albuterol inhaler)      ALPRAZolam (XANAX PO) Take 0.5 mg by mouth 3 times daily as needed for anxiety, Historical      cetirizine (ZYRTEC) 10 MG tablet Take 10 mg by mouth daily as needed for allergies, Historical      EPINEPHrine (ANY BX GENERIC EQUIV) 0.3 MG/0.3ML injection 2-pack Inject 0.3 mg into the muscle as needed for anaphylaxis May repeat one time in 5-15 minutes if response to initial dose is inadequate., Historical      fluticasone-salmeterol (ADVAIR) 100-50 MCG/ACT inhaler Inhale 1 puff into the lungs 2 times daily, Historical      Losartan Potassium (COZAAR PO) Take 25 mg by mouth every morning , Historical      multivitamin, therapeutic with minerals (THERA-VIT-M) TABS tablet Take 1 tablet by mouth daily, Historical      NONFORMULARY Take 1 tablet by mouth daily ferrasorb, Historical      rosuvastatin (CRESTOR) 20 MG tablet Take 1 tablet (20 mg) by mouth daily, Disp-90 tablet, R-3, E-Prescribe      sertraline (ZOLOFT) 100 MG tablet Take 150 mg by mouth every morning, Historical      triamcinolone (NASACORT ALLERGY 24HR) 55 MCG/ACT nasal aerosol Spray 2 sprays into both nostrils daily as needed,  Historical                        Follow-Up Care:  Patient should be seen in the office in 14 days by the Orthopedic Surgeon/Physician Assistant.  Call [phone] for appointment or questions.    It was my pleasure to take care of the above patient.  RAUL Santillan CNP

## 2024-04-12 NOTE — CONSULTS
"[facility]  Consult Note - Hospitalist Service  Date of Admission:  4/11/2024  Consult Requested by: Ortho surgery  Reason for Consult: Post-operative medical management    Assessment & Plan   Zulema Carrillo is a 68 year old female with PMHx significant for osteoarthritis of left knee, Sjogren's syndrome, GERD, hypertension, hyperlipidemia, migraine, obstructive sleep apnea, depression admitted on 4/11/2024 following left total knee arthroplasty.      Hypertension  - Resume home losartan with hold parameters  - Monitor blood pressure    Hyperlipidemia  - Resume home rosuvastatin    Anxiety  Depression  - Resume home sertraline    Reactive airway disease, chronic  - Resume home inhalers PRN    Allergic rhinitis  - Resume home nasal sprays  - Hold home cetirizine    Obstructive sleep apnea  - Patient brought home CPAP    Primary osteoarthritis of left knee, chronic  S/p left total knee arthroplasty (4/11/2024)  - Analgesics, antiemetics, DVT prophylaxis, and therapies per Ortho surgery       The patient's care was discussed with the Attending Physician, Dr. Rowe .    Clinically Significant Risk Factors Present on Admission                  # Hypertension: Noted on problem list      # Overweight: Estimated body mass index is 26.94 kg/m  as calculated from the following:    Height as of this encounter: 1.702 m (5' 7\").    Weight as of this encounter: 78 kg (172 lb).              Aracelis Arevalo PA-C  Hospitalist Service  Securely message with [employer] (more info)  Text page via [facility] Paging/Directory   ______________________________________________________________________      History is obtained from the patient    History of Present Illness   Zulema Carrillo is a 68 year old female with PMHx significant for osteoarthritis of left knee, Sjogren's syndrome, GERD, hypertension, hyperlipidemia, migraine, obstructive sleep apnea, depression who was admitted on 4/11/2024 following left total knee " arthroplasty.    Currently, patient reports that pain is adequately controlled. She has voided without issue. She denies chest pain, shortness of breath, nausea, vomiting, dizziness, lightheadedness.     Patient notes that she brought in her home medications because she is allergic to corn-containing products. Her medications were sent down to pharmacy for labeling.       Past Medical History    Past Medical History:   Diagnosis Date    Anxiety     CAD (coronary artery disease)     Depression     Gastroesophageal reflux disease     Gastroesophageal reflux disease with esophagitis     Heart murmur     HLD (hyperlipidemia)     HTN (hypertension)     Melanoma of skin (H)     Migraine     MVP (mitral valve prolapse)     CHELSEA (obstructive sleep apnea)     CPAP    Osteoarthritis     PONV (postoperative nausea and vomiting)     Reactive airway disease     Sjogren's syndrome (H24)        Past Surgical History   Past Surgical History:   Procedure Laterality Date    AS TOTAL KNEE ARTHROPLASTY      BREAST SURGERY      HYSTERECTOMY      MASTECTOMY      MENISCECTOMY      ORTHOPEDIC SURGERY      REVISE RECONSTRUCTED BREAST BILATERAL Bilateral 10/03/2022    Procedure: BILATERAL BREAST IMPLANT REMOVAL AND TOTAL PERIPROSTHETIC CAPSULECTOMIES, BILATERAL INFERIOR DERMAL FLAPS;  Surgeon: Nell Pearl MD;  Location: [facility] OR    SALPINGO-OOPHORECTOMY BILATERAL         Medications   I have reviewed this patient's current medications       Review of Systems    The 10 point Review of Systems is negative other than noted in the HPI or here.     Social History   I have reviewed this patient's social history and updated it with pertinent information if needed.  Social History     Tobacco Use    Smoking status: Never    Smokeless tobacco: Never   Vaping Use    Vaping status: Never Used   Substance Use Topics    Alcohol use: Yes     Comment: OCCASIONAL    Drug use: Never         Allergies   Allergies   Allergen Reactions    Corn-Containing  "Products Headache, Hives and Difficulty breathing     Congested and agitated  Corn Starch is most severe allergy    Diphenhydramine Hives, Itching and Rash    Prednisone Hives and Rash    Aspirin Hives    Glucosamine Itching    Hydrocodone-Acetaminophen Nausea    Ibuprofen Hives    Iodine Itching     Redness    Lisinopril Cough    Morphine Hives    Nickel     Shellfish-Derived Products Hives    Amoxicillin-Pot Clavulanate Rash    Benzonatate Anxiety    Cephalexin Rash    Ciprofloxacin Rash    Hydromorphone Rash     itching    Zinc Acetate Rash        Physical Exam   Vital Signs: Temp: 98.4  F (36.9  C) Temp src: Oral BP: (!) 141/65 Pulse: 69   Resp: 11 SpO2: 98 % O2 Device: None (Room air) Oxygen Delivery: 1 LPM  Weight: 172 lbs 0 oz    General Appearance: Awake, alert, in no acute distress.  Respiratory: Nasal cannula in place. Lungs clear to auscultation bilaterally. Normal respiratory effort.  Cardiovascular: Regular rate and rhythm. No murmurs. Extremities are warm and well perfused.  GI: Soft, non-tender, non-distended. Normal bowel sounds.   Musculoskeletal: Able to move all extremities freely. No lower extremity edema.  Skin: No obvious rashes or lesions on observed skin.     Medical Decision Making       45 MINUTES SPENT BY ME on the date of service doing chart review, history, exam, documentation & further activities per the note.      Data     I have personally reviewed the following data over the past 24 hrs:    N/A  \   N/A   / N/A     N/A N/A N/A /  N/A   N/A N/A 0.79 \       Imaging results reviewed over the past 24 hrs:   Recent Results (from the past 24 hour(s))   POC US Guidance Needle Placement    Narrative    Ultrasound was performed as guidance to an anesthesia procedure.  Click   \"PACS images\" hyperlink below to view any stored images.  For specific   procedure details, view procedure note authored by anesthesia.     "

## 2024-04-12 NOTE — ANESTHESIA POSTPROCEDURE EVALUATION
Patient: Zulema Carrillo    Procedure: Procedure(s):  LEFT TOTAL KNEE ARTHROPLASTY       Anesthesia Type:  Spinal    Note:  Disposition: Inpatient   Postop Pain Control: Uneventful            Sign Out: Well controlled pain   PONV: No   Neuro/Psych: Uneventful            Sign Out: Acceptable/Baseline neuro status   Airway/Respiratory: Uneventful            Sign Out: Acceptable/Baseline resp. status   CV/Hemodynamics: Uneventful            Sign Out: Acceptable CV status; No obvious hypovolemia; No obvious fluid overload   Other NRE: NONE   DID A NON-ROUTINE EVENT OCCUR? No           Last vitals:  Vitals Value Taken Time   /70 04/11/24 1830   Temp 37.3  C (99.2  F) 04/11/24 1718   Pulse 69 04/11/24 1840   Resp 30 04/11/24 1840   SpO2 98 % 04/11/24 1839   Vitals shown include unfiled device data.    Electronically Signed By: Jason Solomon MD  April 11, 2024  7:01 PM

## 2024-04-12 NOTE — PROGRESS NOTES
04/12/24 1000   Appointment Info   Signing Clinician's Name / Credentials (PT) Ramirez Cunha, PT, DPT   Quick Adds   Quick Adds Certification   Living Environment   People in Home spouse   Current Living Arrangements house   Home Accessibility stairs to enter home;stairs within home   Number of Stairs, Main Entrance 2   Number of Stairs, Within Home, Primary greater than 10 stairs  (7 and 7)   Self-Care   Usual Activity Tolerance good   Current Activity Tolerance good   Equipment Currently Used at Home none   Fall history within last six months no   General Information   Onset of Illness/Injury or Date of Surgery 04/11/24   Referring Physician Dr. Lombardi   Patient/Family Therapy Goals Statement (PT) Decrease pain with mobility   Pertinent History of Current Problem (include personal factors and/or comorbidities that impact the POC) s/p L TKA   Existing Precautions/Restrictions weight bearing   Weight-Bearing Status - LLE weight-bearing as tolerated   Weight-Bearing Status - RLE full weight-bearing   Range of Motion (ROM)   ROM Comment WFL, decreased LLE s/p TKA   Strength (Manual Muscle Testing)   Strength Comments WFL   Bed Mobility   Bed Mobility supine-sit;sit-supine   Supine-Sit [place] (Bed Mobility) modified independence   Sit-Supine [place] (Bed Mobility) modified independence   Impairments Contributing to Impaired Bed Mobility pain   Assistive Device (Bed Mobility) bed rails   Transfers   Transfers sit-stand transfer   Sit-Stand Transfer   Sit-Stand [place] (Transfers) contact guard   Assistive Device (Sit-Stand Transfers) walker, front-wheeled   Gait/Stairs (Locomotion)   [place] Level (Gait) contact guard   Assistive Device (Gait) walker, front-wheeled   Distance in Feet (Gait) 10'   Pattern (Gait) step-through   Deviations/Abnormal Patterns (Gait) coco decreased;stride length decreased   Comment, (Gait/Stairs) Up/down 5 stairs   Clinical Impression   Criteria for Skilled  Therapeutic Intervention Yes, treatment indicated   PT Diagnosis (PT) impaired functional mobility   Influenced by the following impairments pain, decreased ROM, decreased strength   Functional limitations due to impairments gait, stairs, transfers   Clinical Presentation (PT Evaluation Complexity) stable   Clinical Presentation Rationale pt presents as medically diagnosed   Clinical Decision Making (Complexity) low complexity   Planned Therapy Interventions (PT) gait training;home exercise program;patient/family education;ROM (range of motion);stair training;strengthening;transfer training   Risk & Benefits of therapy have been explained care plan/treatment goals reviewed;patient;spouse/significant other   PT Total Evaluation Time   PT Eval, Low Complexity Minutes (82931) 10   Therapy Certification   Start of care date 04/12/24   Certification date from 04/12/24   Certification date to 05/12/24   Medical Diagnosis s/p TKA   Physical Therapy Goals   PT Frequency One time eval and treatment only   PT Predicted Duration/Target Date for Goal Attainment 04/12/24   PT Goals PT Goal 1;Transfers;Gait;Stairs   PT: Transfers Modified independent;Sit to/from stand;Assistive device;Goal Met   PT: Gait Modified independent;Rolling walker;150 feet;Goal Met   PT: Stairs Supervision/stand-by assist;4 stairs;Rail on both sides;Goal Met   PT: Goal 1 Independent with TKA HEP, Goal Met   Interventions   Interventions Quick Adds Therapeutic Procedure;Therapeutic Activity;Gait Training   Therapeutic Procedure/Exercise   Ther. Procedure: strength, endurance, ROM, flexibillity Minutes (78037) 10   Symptoms Noted During/After Treatment fatigue;increased pain   Treatment Detail/Skilled Intervention TKA protocol therex x10 reps, cueing for technique, Independent with written HEP following education   Therapeutic Activity   Therapeutic Activities: dynamic activities to improve functional performance Minutes (40339) 5   Symptoms Noted  During/After Treatment Increased pain   Treatment Detail/Skilled Intervention Supine<>sit Mod I with HOB elevated and use of bed rail, moving well, sit to/from stand SBA, cueing for safe hand placement and LE positioning.   Gait Training   Gait Training Minutes (99517) 10   Symptoms Noted During/After Treatment (Gait Training) fatigue;increased pain   Treatment Detail/Skilled Intervention Pt amb to PT gym and back CGA with FWW and break to do stairs. Navigated 4 with unilateral HR asc and Min A, then bilat HR desc and non-reciprocal technique. Educated on use of SEC and sideway navigation as needed. Pt did one stair up/down sideways holding onto rail with BUEs. Educated on walking program, icing, pain control, POC, role of therapies.   Distance in Feet 80' x 2   [place] Level (Gait Training) stand-by assist   Physical Assistance Level (Gait Training) verbal cues;supervision   Weight Bearing (Gait Training) weight-bearing as tolerated   Assistive Device (Gait Training) rolling walker   Pattern Analysis (Gait Training) swing-through gait   Gait Analysis Deviations decreased coco;decreased step length   Impairments (Gait Analysis/Training) strength decreased;pain   Stair Railings present on left side;present at both sides   Physical Assist/Nonphysical Assist (Stairs) supervision;verbal cues   Level of [place] (Stairs) minimum assist (75% patient effort)   PT Discharge Planning   PT Plan d/c PT   PT Discharge Recommendation (DC Rec) other (see comments)   PT Rationale for DC Rec defer to ortho   PT Brief overview of current status SBA with transfers and amb 80' x 2 with FWW   PT Equipment Needed at Discharge cane, straight  (For d/c)   [facility]  OUTPATIENT PHYSICAL THERAPY EVALUATION  PLAN OF TREATMENT FOR OUTPATIENT REHABILITATION  (COMPLETE FOR INITIAL CLAIMS ONLY)  Patient's Last Name, First Name, M.I.  YOB: 1955  Zulema Carrillo                         Provider's Name  [facility] Medical Record No.  [id number]                             Onset Date:  (P) 04/11/24   Start of Care Date:  (P) 04/12/24   Type:     _X_PT   ___OT   ___SLP Medical Diagnosis:  (P) s/p TKA              PT Diagnosis:  (P) impaired functional mobility Visits from SOC:  1     See note for plan of treatment, functional goals and certification details    I CERTIFY THE NEED FOR THESE SERVICES FURNISHED UNDER        THIS PLAN OF TREATMENT AND WHILE UNDER MY CARE     (Physician co-signature of this document indicates review and certification of the therapy plan).

## 2024-04-12 NOTE — PROGRESS NOTES
"[facility] Progress Note      Post-operative Day: 1 Day Post-Op    Procedure(s):  LEFT TOTAL KNEE ARTHROPLASTY      Subjective: Patient seen resting in bedside chair.  No acute events overnight  at bedside.  Endorses pain tolerable on p.o. analgesics.  Tolerating regular diet with no nausea or vomiting.  Voiding without difficulty.  Feeling ready to discharge home.     Pain: mild/moderate  Chest pain, SOB:  No      Objective:  Blood pressure 135/68, pulse 73, temperature 98.4  F (36.9  C), temperature source Oral, resp. rate 16, height 1.702 m (5' 7\"), weight 78 kg (172 lb), SpO2 93%.    Patient Vitals for the past 24 hrs:   BP Temp Temp src Pulse Resp SpO2 Height Weight   04/12/24 0819 135/68 98.4  F (36.9  C) Oral 73 16 93 % -- --   04/12/24 0630 (!) 152/74 98.6  F (37  C) Oral 64 15 94 % -- --   04/12/24 0300 (!) 141/74 98.4  F (36.9  C) Oral 68 14 91 % -- --   04/11/24 2300 (!) 145/84 98.2  F (36.8  C) Oral 69 15 93 % -- --   04/11/24 2000 (!) 141/65 -- -- 69 11 98 % -- --   04/11/24 1930 134/76 -- -- 70 -- -- -- --   04/11/24 1900 130/87 98.4  F (36.9  C) Oral 67 15 97 % -- --   04/11/24 1800 139/73 -- -- 67 23 93 % -- --   04/11/24 1724 (!) 148/77 -- -- 68 16 100 % -- --   04/11/24 1718 139/69 99.2  F (37.3  C) Temporal 71 14 100 % -- --   04/11/24 1515 -- -- -- 65 23 98 % -- --   04/11/24 1510 (!) 166/89 -- -- 66 21 98 % -- --   04/11/24 1505 (!) 151/83 -- -- 70 22 97 % -- --   04/11/24 1500 (!) 140/74 -- -- 66 12 95 % -- --   04/11/24 1455 (!) 144/71 -- -- 66 12 94 % -- --   04/11/24 1450 (!) 140/72 -- -- 66 11 93 % -- --   04/11/24 1445 (!) 142/69 -- -- 66 18 93 % -- --   04/11/24 1440 (!) 144/74 -- -- 65 26 97 % -- --   04/11/24 1435 (!) 165/81 -- -- 64 17 100 % -- --   04/11/24 1430 (!) 163/87 -- -- 63 20 99 % -- --   04/11/24 1426 -- -- -- -- -- 97 % -- --   04/11/24 1306 (!) 172/81 97.7  F (36.5  C) Temporal 67 15 96 % 1.702 m (5' 7\") 78 kg (172 lb)       Wt Readings from Last 4 " Encounters:   04/11/24 78 kg (172 lb)   11/06/23 79 kg (174 lb 3.2 oz)   10/26/22 78.5 kg (173 lb)   10/03/22 77.1 kg (169 lb 14.4 oz)       General: Alert and orientated, NAD  Respiratory: Non-labored breathing on RA  LLE motor function, sensation, and circulation intact   Yes, Dorsiflexion/plantarflexion intact and equal bilaterally. Moves all other extremities with ease and purpose   Wound status: incisions are clean dry and intact. Yes  Calf tenderness: Bilateral  No    Pertinent Labs   Lab Results: personally reviewed.     Recent Labs   Lab Test 04/12/24  0615 09/26/22  0823 06/17/17  0640 06/14/17  1706   WBC  --   --  4.1 6.7   HGB 10.0*  --  9.7* 10.8*   HCT  --   --  30.4* 32.7*   MCV  --   --  81 79   *  --  181 156   NA  --  140 142 130*       Plan:   Anticoagulation protocol: Lovenox 40 mg daily  x 28  days, recommend follow up with PCP for recheck of platelets.   Pain medications:  scopainmedication: Liquid Oxycodone, Tylenol. Reports does not tolerate NSAIDs  Weight bearing status:  WBAT  Disposition:  Home today therapies and clearance from hospital medicine team  Continue cares and rehabilitation     Report completed by:  RAUL Santillan CNP  Date: 4/12/2024  Time: 12:00 PM

## 2024-04-12 NOTE — PLAN OF CARE
Patient vital signs are at baseline: Yes, except for elevated BP (Systolic in 140's).   Patient able to ambulate as they were prior to admission or with assist devices provided by therapies during their stay:  Yes; SBA  Patient MUST void prior to discharge:  Yes  Patient able to tolerate oral intake:  Yes  Pain has adequate pain control using Oral analgesics:  Yes; Toradol, PRN Oxycodone, and cold packs.  Does patient have an identified :  Yes  Has goal D/C date and time been discussed with patient:  Yes  Goal Outcome Evaluation:       Pt is alert and oriented X 4. Dressing is CDI.   Pt is allergic to corn-containing products. Taking her home medications. Pt was concerned about if IV Cefazolin has some corn products. Check with pharmacy. Pharmacy confirmed no corn products. Education provided. Calls appropriately. Bed alarm is on for safety.

## 2024-04-12 NOTE — PROGRESS NOTES
Received consult at 1912, reviewed chart and PTA meds ordered PTA inhalers patient is otherwise stable full consult note in the a.m. to follow

## 2024-04-12 NOTE — PROGRESS NOTES
Occupational Therapy: Orders received. Chart reviewed and discussed with care team.? Occupational Therapy not indicated due to pt having good home setup/support and is Mod I w/ ADLs, per PT.? Defer discharge recommendations to ortho team.? Will complete orders.

## 2024-12-03 DIAGNOSIS — E78.00 PURE HYPERCHOLESTEROLEMIA: ICD-10-CM

## 2024-12-03 RX ORDER — ROSUVASTATIN CALCIUM 20 MG/1
20 TABLET, COATED ORAL DAILY
Qty: 90 TABLET | Refills: 0 | Status: SHIPPED | OUTPATIENT
Start: 2024-12-03

## 2024-12-03 NOTE — LETTER
December 3, 2024       TO: Zulema Carrillo  [address]  [place] [address]       Dear Zulema Carrillo,    We recently received a call from your pharmacy requesting a refill of your medication(s).    Our records indicate that you are due for follow-up with your Heart Care Provider. We will refill your medications for 3 months which will allow you enough time to be seen.    Please call [phone] to schedule your appointment.    Thank you for allowing [facility] to be a part of your health care team and we look forward to seeing you soon.    Thank you,    [facility]

## 2025-02-11 DIAGNOSIS — I10 ESSENTIAL HYPERTENSION: ICD-10-CM

## 2025-02-11 DIAGNOSIS — E78.00 PURE HYPERCHOLESTEROLEMIA: Primary | ICD-10-CM

## 2025-04-03 ENCOUNTER — LAB (OUTPATIENT)
Dept: LAB | Facility: CLINIC | Age: 70
End: 2025-04-03
Payer: COMMERCIAL

## 2025-04-03 DIAGNOSIS — I10 ESSENTIAL HYPERTENSION: ICD-10-CM

## 2025-04-03 DIAGNOSIS — E78.00 PURE HYPERCHOLESTEROLEMIA: ICD-10-CM

## 2025-04-03 LAB
ALT SERPL W P-5'-P-CCNC: 19 U/L (ref 0–50)
ANION GAP SERPL CALCULATED.3IONS-SCNC: 12 MMOL/L (ref 7–15)
BUN SERPL-MCNC: 13.4 MG/DL (ref 8–23)
CALCIUM SERPL-MCNC: 9.4 MG/DL (ref 8.8–10.4)
CHLORIDE SERPL-SCNC: 108 MMOL/L (ref 98–107)
CHOLEST SERPL-MCNC: 140 MG/DL
CREAT SERPL-MCNC: 0.94 MG/DL (ref 0.51–0.95)
EGFRCR SERPLBLD CKD-EPI 2021: 66 ML/MIN/1.73M2
FASTING STATUS PATIENT QL REPORTED: YES
GLUCOSE SERPL-MCNC: 104 MG/DL (ref 70–99)
HCO3 SERPL-SCNC: 21 MMOL/L (ref 22–29)
HDLC SERPL-MCNC: 43 MG/DL
LDLC SERPL CALC-MCNC: 80 MG/DL
NONHDLC SERPL-MCNC: 97 MG/DL
POTASSIUM SERPL-SCNC: 3.9 MMOL/L (ref 3.4–5.3)
SODIUM SERPL-SCNC: 141 MMOL/L (ref 135–145)
TRIGL SERPL-MCNC: 86 MG/DL

## 2025-04-07 DIAGNOSIS — E78.00 PURE HYPERCHOLESTEROLEMIA: ICD-10-CM

## 2025-04-07 RX ORDER — ROSUVASTATIN CALCIUM 20 MG/1
TABLET, COATED ORAL
Qty: 90 TABLET | Refills: 0 | Status: SHIPPED | OUTPATIENT
Start: 2025-04-07

## 2025-04-22 ENCOUNTER — OFFICE VISIT (OUTPATIENT)
Dept: CARDIOLOGY | Facility: CLINIC | Age: 70
End: 2025-04-22
Payer: COMMERCIAL

## 2025-04-22 VITALS
DIASTOLIC BLOOD PRESSURE: 78 MMHG | SYSTOLIC BLOOD PRESSURE: 136 MMHG | OXYGEN SATURATION: 97 % | BODY MASS INDEX: 29.38 KG/M2 | WEIGHT: 187.2 LBS | HEIGHT: 67 IN | HEART RATE: 69 BPM

## 2025-04-22 DIAGNOSIS — R07.89 ATYPICAL CHEST PAIN: ICD-10-CM

## 2025-04-22 DIAGNOSIS — R73.01 IFG (IMPAIRED FASTING GLUCOSE): ICD-10-CM

## 2025-04-22 DIAGNOSIS — E78.00 PURE HYPERCHOLESTEROLEMIA: ICD-10-CM

## 2025-04-22 DIAGNOSIS — R06.09 DOE (DYSPNEA ON EXERTION): ICD-10-CM

## 2025-04-22 DIAGNOSIS — I10 ESSENTIAL HYPERTENSION: ICD-10-CM

## 2025-04-22 DIAGNOSIS — R93.1 ELEVATED CORONARY ARTERY CALCIUM SCORE: Primary | ICD-10-CM

## 2025-04-22 RX ORDER — ROSUVASTATIN CALCIUM 20 MG/1
20 TABLET, COATED ORAL DAILY
Qty: 90 TABLET | Refills: 3 | Status: SHIPPED | OUTPATIENT
Start: 2025-04-22

## 2025-04-22 NOTE — PROGRESS NOTES
HPI and Plan:   Zulema is a very nice 69-year-old woman whose family history significant for mother having coronary bypass grafting in her 70s.  Zulema is a lifelong nonsmoker, does not have diabetes mellitus.  She does have hyperlipidemia, on medications and does not have hypertension.    I met her in 2021 when she returned from [place] and noted she had developed significant dyspnea on exertion.  She had been walking 4 miles a day effortlessly while down in [place].  Ultimately we figured out it was allergies she was flying back into causing sinus problems but as well dyspnea on exertion.  She is also BRCA positive with a strong family history of breast cancer and as result has had bilateral mastectomies and breast implants which causes her an atypical chest pain from time to time.  Plan was to remove her implants however due to her multiple allergies they decided against it.     In 2021 she was initially was seen in urgent care but then referred on to the emergency room.  There, EKG and chest x-ray were normal but she did have an elevated D-dimer.  CT angiogram of the chest was negative for pulmonary embolus.  Troponins were less than 0.015.  N-terminal proBNP was 19.  Echocardiogram demonstrated ejection fraction of 60%-65% without focal wall motion abnormalities.  Of note, she also in the past was told she had mitral valve prolapse but mitral valve was now described as normal with only trace mitral regurgitation.  Pulmonary pressures could not be estimated, as tricuspid regurgitation was only trace.  She did have a normal IVC.     Stress nuclear scan was negative for ischemia.  A calcium score came back at 10.3 putting her in the 72nd percentile for age and gender.    We focused on risk factor modification and lifestyle     She runs an iron deficiency anemia.  She states her mother had B12 deficiency.  She is undergoing an evaluation through primary care.    Zulema has again developed some dyspnea on  exertion but cites at times presently with her return of allergies.  She is frustrated that she has gained a significant amount of weight this year.  Because of orthopedic issues, she had her left knee replaced this year she has switched from walking 4 miles a day to doing warm water aerobics 3 times a week.  She notes no symptoms associated with her workouts.  She does no resistance activity     ASSESSMENT AND PLAN: Zulema still has her atypical chest pain that does not sound cardiac in origin.  We discussed her dyspnea on exertion as to whether we should repeat her stress test or just wait till allergy season resolved to see if she still has dyspnea exertion.  If it changes or persists after allergy season we will perform a stress echocardiogram     Clinically she does not appear to have heart failure or any significant arrhythmias.    Blood pressure is well-controlled on losartan at 136/78 with a pulse of 69    Weight unfortunately is 187 giving her body mass index of 29.3.  We talked about the fact that water aerobics is a good exercise for fitness, resistance and range of motion however it is not the best for weight loss and this may be one of the contributing factors.  She may have been burning more calories with her 4 mile walk every day.  We also talked about the fact that metabolism slows down over time.  We talked about the importance of adding some resistance activity to build up muscle mass to drive her basic metabolic rate up.     As stated, cholesterol profile above.  This is adequate, as she does not have any significant underlying coronary disease but obviously, if she is high risk based on her calcium score or an abnormal stress test, I will get much more aggressive and aim to get her LDL, at minimum, below 100 but ideally below 70.     Fasting lipid profile has backslid a bit with her weight gain and change in exercise.  Total cholesterol is 140, HDL 43, LDL of 80 and triglycerides of 86.  She is  on rosuvastatin 20.  We decided we will focus on weight loss and exercise and recheck next year.  If it continues to go up I would increase her rosuvastatin to 40 mg daily.  ALT is 19.     Hemoglobin last year was 10.0.  As stated she is being evaluated to her primary care team.    BMP has a slightly elevated chloride at 108 and a slightly low CO2 at 21.  Glucose is also mildly elevated at 104 consistent with impaired fasting glucose.  I would continue to focus on diet, exercise and weight loss.      Will plan on follow-up in 1 year unless her dyspnea on exertion persist.  Thank you for allow me to participate in her care.  Sincerely,     Anam Rollins MD, [facility]ed with [facility]          Today's clinic visit entailed:  Review of the result(s) of each unique test - lab work, calcium score, stress test, echocardiogram  Ordering of each unique test  Prescription drug management  30 minutes spent by me on the date of the encounter doing chart review, history and exam, documentation and further activities per the note  Provider  Link to MDM Help Grid           No orders of the defined types were placed in this encounter.      Orders Placed This Encounter   Medications    rosuvastatin (CRESTOR) 20 MG tablet     Sig: Take 1 tablet (20 mg) by mouth daily.     Dispense:  90 tablet     Refill:  3       Medications Discontinued During This Encounter   Medication Reason    rosuvastatin (CRESTOR) 20 MG tablet Reorder (No AVS)    oxyCODONE (ROXICODONE) 5 MG/5ML solution     tiZANidine (ZANAFLEX) 2 MG tablet          Encounter Diagnoses   Name Primary?    Pure hypercholesterolemia     Elevated coronary artery calcium score Yes    HERRON (dyspnea on exertion)     Essential hypertension     Atypical chest pain        CURRENT MEDICATIONS:  Current Outpatient Medications   Medication Sig Dispense Refill    albuterol (PROAIR HFA/PROVENTIL HFA/VENTOLIN HFA) 108 (90 Base) MCG/ACT inhaler Inhale 2 puffs into the lungs every 6 hours as needed       ALPRAZolam (XANAX PO) Take 0.5 mg by mouth 3 times daily as needed for anxiety      cetirizine (ZYRTEC) 10 MG tablet Take 10 mg by mouth daily as needed for allergies      EPINEPHrine (ANY BX GENERIC EQUIV) 0.3 MG/0.3ML injection 2-pack Inject 0.3 mg into the muscle as needed for anaphylaxis May repeat one time in 5-15 minutes if response to initial dose is inadequate.      fluticasone-salmeterol (ADVAIR) 100-50 MCG/ACT inhaler Inhale 1 puff into the lungs 2 times daily      Losartan Potassium (COZAAR PO) Take 25 mg by mouth every morning       multivitamin, therapeutic with minerals (THERA-VIT-M) TABS tablet Take 1 tablet by mouth daily      NONFORMULARY Take 1 tablet by mouth daily ferrasorb      rosuvastatin (CRESTOR) 20 MG tablet Take 1 tablet (20 mg) by mouth daily. 90 tablet 3    sertraline (ZOLOFT) 100 MG tablet Take 150 mg by mouth every morning      triamcinolone (NASACORT ALLERGY 24HR) 55 MCG/ACT nasal aerosol Spray 2 sprays into both nostrils daily as needed         ALLERGIES     Allergies   Allergen Reactions    Corn-Containing Products Headache, Hives and Difficulty breathing     Congested and agitated  Corn Starch is most severe allergy    Diphenhydramine Hives, Itching and Rash    Prednisone Hives and Rash    Aspirin Hives    Glucosamine Itching    Hydrocodone-Acetaminophen Nausea    Ibuprofen Hives    Iodine Itching     Redness    Lisinopril Cough    Morphine Hives    Nickel     Shellfish-Derived Products Hives    Amoxicillin-Pot Clavulanate Rash    Benzonatate Anxiety    Cephalexin Rash    Ciprofloxacin Rash    Hydromorphone Rash     itching    Zinc Acetate Rash       PAST MEDICAL HISTORY:  Past Medical History:   Diagnosis Date    Anxiety     CAD (coronary artery disease)     Depression     Gastroesophageal reflux disease     Gastroesophageal reflux disease with esophagitis     Heart murmur     HLD (hyperlipidemia)     HTN (hypertension)     Melanoma of skin (H)     Migraine     MVP (mitral valve  prolapse)     CHELSEA (obstructive sleep apnea)     CPAP    Osteoarthritis     PONV (postoperative nausea and vomiting)     Reactive airway disease     Sjogren's syndrome        PAST SURGICAL HISTORY:  Past Surgical History:   Procedure Laterality Date    ARTHROPLASTY KNEE Left 4/11/2024    Procedure: LEFT TOTAL KNEE ARTHROPLASTY;  Surgeon: Paresh Lombardi MD;  Location: [facility] Main OR    AS TOTAL KNEE ARTHROPLASTY      BREAST SURGERY      HYSTERECTOMY      MASTECTOMY      MENISCECTOMY      ORTHOPEDIC SURGERY      REVISE RECONSTRUCTED BREAST BILATERAL Bilateral 10/03/2022    Procedure: BILATERAL BREAST IMPLANT REMOVAL AND TOTAL PERIPROSTHETIC CAPSULECTOMIES, BILATERAL INFERIOR DERMAL FLAPS;  Surgeon: Nell Pearl MD;  Location: [facility] OR    SALPINGO-OOPHORECTOMY BILATERAL         FAMILY HISTORY:  History reviewed. No pertinent family history.    SOCIAL HISTORY:  Social History     Socioeconomic History    Marital status:      Spouse name: None    Number of children: None    Years of education: None    Highest education level: None   Tobacco Use    Smoking status: Never    Smokeless tobacco: Never   Vaping Use    Vaping status: Never Used   Substance and Sexual Activity    Alcohol use: Yes     Comment: OCCASIONAL    Drug use: Never     Social Drivers of Health     Financial Resource Strain: Not At Risk (10/31/2023)    Received from [employer], [facility]    Financial Resource Strain     Is it hard for you to pay for the very basics like food, housing, medical care or heating?: No   Food Insecurity: No Food Insecurity (11/10/2024)    Received from [employer]    Hunger Vital Sign     Worried About Running Out of Food in the Last Year: Never true     Ran Out of Food in the Last Year: Never true   Transportation Needs: No Transportation Needs (11/10/2024)    Received from [employer]    PRAPARE - Transportation     Lack of Transportation (Medical): No     Lack of Transportation (Non-Medical): No  "  Housing Stability: Low Risk  (11/10/2024)    [facility]    Housing Stability Vital Sign     Unable to Pay for Housing in the Last Year: No     Number of Times Moved in the Last Year: 0     Homeless in the Last Year: No       Review of Systems:  Skin:          Eyes:         ENT:         Respiratory:          Cardiovascular:         Gastroenterology:        Genitourinary:         Musculoskeletal:         Neurologic:         Psychiatric:         Heme/Lymph/Imm:         Endocrine:           Physical Exam:  Vitals: /78 (BP Location: Right arm, Patient Position: Sitting, Cuff Size: Adult Regular)   Pulse 69   Ht 1.702 m (5' 7\")   Wt 84.9 kg (187 lb 3.2 oz)   SpO2 97%   BMI 29.32 kg/m      Constitutional:  cooperative, alert and oriented, well developed, well nourished, in no acute distress overweight      Skin:  warm and dry to the touch          Head:  normocephalic        Eyes:  pupils equal and round, conjunctivae and lids unremarkable, sclera white, no xanthalasma, EOMS intact, no nystagmus        Lymph:      ENT:  no pallor or cyanosis, dentition good        Neck:  no carotid bruit        Respiratory:  normal respiratory excursion, clear to auscultation         Cardiac: regular rhythm, no murmurs, gallops or rubs detected                pulses full and equal                                        GI:    obese      Extremities and Muscular Skeletal:  no edema, normal muscle strength and tone         Well-healed left knee surgical scar    Neurological:  no gross motor deficits        Psych:  affect appropriate, oriented to time, person and place        JENNIFFER Saravia, APRN CNP  [address]  [place] [address]                "

## 2025-04-22 NOTE — LETTER
4/22/2025    GERSON TORRES [facility]  [address] [place] [address]    RE: Zulemaanmol Carrillo       Dear Colleague,     I had the pleasure of seeing Zulema Carrillo in the [facility].  HPI and Plan:   Zulema is a very nice 69-year-old woman whose family history significant for mother having coronary bypass grafting in her 70s.  Zulema is a lifelong nonsmoker, does not have diabetes mellitus.  She does have hyperlipidemia, on medications and does not have hypertension.    I met her in 2021 when she returned from [place] and noted she had developed significant dyspnea on exertion.  She had been walking 4 miles a day effortlessly while down in [place].  Ultimately we figured out it was allergies she was flying back into causing sinus problems but as well dyspnea on exertion.  She is also BRCA positive with a strong family history of breast cancer and as result has had bilateral mastectomies and breast implants which causes her an atypical chest pain from time to time.  Plan was to remove her implants however due to her multiple allergies they decided against it.     In 2021 she was initially was seen in urgent care but then referred on to the emergency room.  There, EKG and chest x-ray were normal but she did have an elevated D-dimer.  CT angiogram of the chest was negative for pulmonary embolus.  Troponins were less than 0.015.  N-terminal proBNP was 19.  Echocardiogram demonstrated ejection fraction of 60%-65% without focal wall motion abnormalities.  Of note, she also in the past was told she had mitral valve prolapse but mitral valve was now described as normal with only trace mitral regurgitation.  Pulmonary pressures could not be estimated, as tricuspid regurgitation was only trace.  She did have a normal IVC.     Stress nuclear scan was negative for ischemia.  A calcium score came back at 10.3 putting her in the 72nd percentile for age and gender.    We focused on risk factor modification and  lifestyle     She runs an iron deficiency anemia.  She states her mother had B12 deficiency.  She is undergoing an evaluation through primary care.    Zulema has again developed some dyspnea on exertion but cites at times presently with her return of allergies.  She is frustrated that she has gained a significant amount of weight this year.  Because of orthopedic issues, she had her left knee replaced this year she has switched from walking 4 miles a day to doing warm water aerobics 3 times a week.  She notes no symptoms associated with her workouts.  She does no resistance activity     ASSESSMENT AND PLAN: Zulema still has her atypical chest pain that does not sound cardiac in origin.  We discussed her dyspnea on exertion as to whether we should repeat her stress test or just wait till allergy season resolved to see if she still has dyspnea exertion.  If it changes or persists after allergy season we will perform a stress echocardiogram     Clinically she does not appear to have heart failure or any significant arrhythmias.    Blood pressure is well-controlled on losartan at 136/78 with a pulse of 69    Weight unfortunately is 187 giving her body mass index of 29.3.  We talked about the fact that water aerobics is a good exercise for fitness, resistance and range of motion however it is not the best for weight loss and this may be one of the contributing factors.  She may have been burning more calories with her 4 mile walk every day.  We also talked about the fact that metabolism slows down over time.  We talked about the importance of adding some resistance activity to build up muscle mass to drive her basic metabolic rate up.     As stated, cholesterol profile above.  This is adequate, as she does not have any significant underlying coronary disease but obviously, if she is high risk based on her calcium score or an abnormal stress test, I will get much more aggressive and aim to get her LDL, at minimum, below  100 but ideally below 70.     Fasting lipid profile has backslid a bit with her weight gain and change in exercise.  Total cholesterol is 140, HDL 43, LDL of 80 and triglycerides of 86.  She is on rosuvastatin 20.  We decided we will focus on weight loss and exercise and recheck next year.  If it continues to go up I would increase her rosuvastatin to 40 mg daily.  ALT is 19.     Hemoglobin last year was 10.0.  As stated she is being evaluated to her primary care team.    BMP has a slightly elevated chloride at 108 and a slightly low CO2 at 21.  Glucose is also mildly elevated at 104 consistent with impaired fasting glucose.  I would continue to focus on diet, exercise and weight loss.      Will plan on follow-up in 1 year unless her dyspnea on exertion persist.  Thank you for allow me to participate in her care.  Sincerely,     Anam Rollins MD, [facility]          Today's clinic visit entailed:  Review of the result(s) of each unique test - lab work, calcium score, stress test, echocardiogram  Ordering of each unique test  Prescription drug management  30 minutes spent by me on the date of the encounter doing chart review, history and exam, documentation and further activities per the note  Provider  Link to MDM Help Grid           No orders of the defined types were placed in this encounter.      Orders Placed This Encounter   Medications     rosuvastatin (CRESTOR) 20 MG tablet     Sig: Take 1 tablet (20 mg) by mouth daily.     Dispense:  90 tablet     Refill:  3       Medications Discontinued During This Encounter   Medication Reason     rosuvastatin (CRESTOR) 20 MG tablet Reorder (No AVS)     oxyCODONE (ROXICODONE) 5 MG/5ML solution      tiZANidine (ZANAFLEX) 2 MG tablet          Encounter Diagnoses   Name Primary?     Pure hypercholesterolemia      Elevated coronary artery calcium score Yes     HERRON (dyspnea on exertion)      Essential hypertension      Atypical chest pain        CURRENT MEDICATIONS:  Current  Outpatient Medications   Medication Sig Dispense Refill     albuterol (PROAIR HFA/PROVENTIL HFA/VENTOLIN HFA) 108 (90 Base) MCG/ACT inhaler Inhale 2 puffs into the lungs every 6 hours as needed       ALPRAZolam (XANAX PO) Take 0.5 mg by mouth 3 times daily as needed for anxiety       cetirizine (ZYRTEC) 10 MG tablet Take 10 mg by mouth daily as needed for allergies       EPINEPHrine (ANY BX GENERIC EQUIV) 0.3 MG/0.3ML injection 2-pack Inject 0.3 mg into the muscle as needed for anaphylaxis May repeat one time in 5-15 minutes if response to initial dose is inadequate.       fluticasone-salmeterol (ADVAIR) 100-50 MCG/ACT inhaler Inhale 1 puff into the lungs 2 times daily       Losartan Potassium (COZAAR PO) Take 25 mg by mouth every morning        multivitamin, therapeutic with minerals (THERA-VIT-M) TABS tablet Take 1 tablet by mouth daily       NONFORMULARY Take 1 tablet by mouth daily ferrasorb       rosuvastatin (CRESTOR) 20 MG tablet Take 1 tablet (20 mg) by mouth daily. 90 tablet 3     sertraline (ZOLOFT) 100 MG tablet Take 150 mg by mouth every morning       triamcinolone (NASACORT ALLERGY 24HR) 55 MCG/ACT nasal aerosol Spray 2 sprays into both nostrils daily as needed         ALLERGIES     Allergies   Allergen Reactions     Corn-Containing Products Headache, Hives and Difficulty breathing     Congested and agitated  Corn Starch is most severe allergy     Diphenhydramine Hives, Itching and Rash     Prednisone Hives and Rash     Aspirin Hives     Glucosamine Itching     Hydrocodone-Acetaminophen Nausea     Ibuprofen Hives     Iodine Itching     Redness     Lisinopril Cough     Morphine Hives     Nickel      Shellfish-Derived Products Hives     Amoxicillin-Pot Clavulanate Rash     Benzonatate Anxiety     Cephalexin Rash     Ciprofloxacin Rash     Hydromorphone Rash     itching     Zinc Acetate Rash       PAST MEDICAL HISTORY:  Past Medical History:   Diagnosis Date     Anxiety      CAD (coronary artery disease)       Depression      Gastroesophageal reflux disease      Gastroesophageal reflux disease with esophagitis      Heart murmur      HLD (hyperlipidemia)      HTN (hypertension)      Melanoma of skin (H)      Migraine      MVP (mitral valve prolapse)      CHELSEA (obstructive sleep apnea)     CPAP     Osteoarthritis      PONV (postoperative nausea and vomiting)      Reactive airway disease      Sjogren's syndrome        PAST SURGICAL HISTORY:  Past Surgical History:   Procedure Laterality Date     ARTHROPLASTY KNEE Left 4/11/2024    Procedure: LEFT TOTAL KNEE ARTHROPLASTY;  Surgeon: Paresh Lombardi MD;  Location: [facility] Main OR     AS TOTAL KNEE ARTHROPLASTY       BREAST SURGERY       HYSTERECTOMY       MASTECTOMY       MENISCECTOMY       ORTHOPEDIC SURGERY       REVISE RECONSTRUCTED BREAST BILATERAL Bilateral 10/03/2022    Procedure: BILATERAL BREAST IMPLANT REMOVAL AND TOTAL PERIPROSTHETIC CAPSULECTOMIES, BILATERAL INFERIOR DERMAL FLAPS;  Surgeon: Nell Pearl MD;  Location: [facility] OR     SALPINGO-OOPHORECTOMY BILATERAL         FAMILY HISTORY:  History reviewed. No pertinent family history.    SOCIAL HISTORY:  Social History     Socioeconomic History     Marital status:      Spouse name: None     Number of children: None     Years of education: None     Highest education level: None   Tobacco Use     Smoking status: Never     Smokeless tobacco: Never   Vaping Use     Vaping status: Never Used   Substance and Sexual Activity     Alcohol use: Yes     Comment: OCCASIONAL     Drug use: Never     Social Drivers of Health     Financial Resource Strain: Not At Risk (10/31/2023)    [facility]    Financial Resource Strain      Is it hard for you to pay for the very basics like food, housing, medical care or heating?: No   Food Insecurity: No Food Insecurity (11/10/2024)    Received from [employer]    Hunger Vital Sign      Worried About Running Out of Food in the Last Year: Never  "true      Ran Out of Food in the Last Year: Never true   Transportation Needs: No Transportation Needs (11/10/2024)    [email or web address]      Lack of Transportation (Medical): No      Lack of Transportation (Non-Medical): No   Housing Stability: Low Risk  (11/10/2024)    Received from [employer]    Housing Stability Vital Sign      Unable to Pay for Housing in the Last Year: No      Number of Times Moved in the Last Year: 0      Homeless in the Last Year: No       Review of Systems:  Skin:          Eyes:         ENT:         Respiratory:          Cardiovascular:         Gastroenterology:        Genitourinary:         Musculoskeletal:         Neurologic:         Psychiatric:         Heme/Lymph/Imm:         Endocrine:           Physical Exam:  Vitals: /78 (BP Location: Right arm, Patient Position: Sitting, Cuff Size: Adult Regular)   Pulse 69   Ht 1.702 m (5' 7\")   Wt 84.9 kg (187 lb 3.2 oz)   SpO2 97%   BMI 29.32 kg/m      Constitutional:  cooperative, alert and oriented, well developed, well nourished, in no acute distress overweight      Skin:  warm and dry to the touch          Head:  normocephalic        Eyes:  pupils equal and round, conjunctivae and lids unremarkable, sclera white, no xanthalasma, EOMS intact, no nystagmus        Lymph:      ENT:  no pallor or cyanosis, dentition good        Neck:  no carotid bruit        Respiratory:  normal respiratory excursion, clear to auscultation         Cardiac: regular rhythm, no murmurs, gallops or rubs detected                pulses full and equal                                        GI:    obese      Extremities and Muscular Skeletal:  no edema, normal muscle strength and tone         Well-healed left knee surgical scar    Neurological:  no gross motor deficits        Psych:  affect appropriate, oriented to time, person and place        RAUL Feng CNP  [address]  [place] " [id number]                  Thank you for allowing me to participate in the care of your patient.      Sincerely,     Anam Rollins MD     [facility]  cc:   RAUL Villareal CNP  [address]  [place] [id number]

## (undated) DEVICE — SUTURE VICRYL+ 1 18 CT/CR  VLT VCP753D

## (undated) DEVICE — SUCTION MANIFOLD NEPTUNE 2 SYS 4 PORT 0702-020-000

## (undated) DEVICE — DRSG AQUACEL AG HYDROFIBER  3.5X10" 422605

## (undated) DEVICE — HOLDER LIMB VELCRO OR 0814-1533

## (undated) DEVICE — DRSG STERI STRIP 1/2X4" R1547

## (undated) DEVICE — DRSG KERLIX FLUFFS X5

## (undated) DEVICE — PEN MARKING SKIN

## (undated) DEVICE — SOL WATER IRRIG 1000ML BOTTLE 2F7114

## (undated) DEVICE — DRAIN JACKSON PRATT 15FR ROUND SIL LF JP-2229

## (undated) DEVICE — GLOVE PROTEXIS BLUE W/NEU-THERA 7.5  2D73EB75

## (undated) DEVICE — DRAIN JACKSON PRATT RESERVOIR 100ML SU130-1305

## (undated) DEVICE — HOOD SURG T7PLUS PEEL AWAY FACE SHIELD STRL LF 0416-801-100

## (undated) DEVICE — DRSG KERLIX 4 1/2"X4YDS ROLL 6715

## (undated) DEVICE — DRSG ABDOMINAL 07 1/2X8" 7197D

## (undated) DEVICE — CLOSURE SYS SKIN PREMIERPRO EXOFIN FUSION 4X22CM STRL 3472

## (undated) DEVICE — DECANTER VIAL 2006S

## (undated) DEVICE — SU ETHIBOND 1 CT-1 30" X425H

## (undated) DEVICE — SOL NACL 0.9% IRRIG 1000ML BOTTLE 2F7124

## (undated) DEVICE — DRAPE STERI INCISE 1050

## (undated) DEVICE — SUCTION CANISTER MEDIVAC LINER 3000ML W/LID 65651-530

## (undated) DEVICE — SPONGE LAP 18X18" X8435

## (undated) DEVICE — GLOVE BIOGEL PI SZ 7.0 40870

## (undated) DEVICE — BNDG ELASTIC 6"X5YDS DOUBLE STERILE

## (undated) DEVICE — SOL NACL 0.9% INJ 1000ML BAG 2B1324X

## (undated) DEVICE — ELECTRODE PATIENT RETURN ADULT L10 FT 2 PLATE CORD 0855C

## (undated) DEVICE — GLOVE BIOGEL PI INDICATOR 8.0 LF 41680

## (undated) DEVICE — BLADE SAW SAGITTAL STRK DUAL CUT 4118-135-090

## (undated) DEVICE — DRSG XEROFORM 5X9" 8884431605

## (undated) DEVICE — PACK MAJOR SBA15MAFSI

## (undated) DEVICE — GLOVE BIOGEL PI SZ 8.0 40880

## (undated) DEVICE — GLOVE PROTEXIS BLUE W/NEU-THERA 7.0  2D73EB70

## (undated) DEVICE — SU DERMABOND ADVANCED .7ML DNX12

## (undated) DEVICE — STPL SKIN PROXIMATE 35 WIDE PMW35

## (undated) DEVICE — HOOD SURG T7 PLUS STRL LF DISP 0416-801-200

## (undated) DEVICE — SU ETHICON STRATAFIX SPR PS 3-0 30X30CM SXMP2B412

## (undated) DEVICE — DRAPE BREAST/CHEST 29420

## (undated) DEVICE — SU MONOCRYL 4-0 PS-2 18" UND Y496G

## (undated) DEVICE — ESU ELEC BLADE NN-STCK PLUMEPEN PRO 360D 10FT PLPRO4020

## (undated) DEVICE — SU SILK 2-0 FSL 18" 677G

## (undated) DEVICE — BLADE SAW SAGITTAL STRK WIDE 25.4X85X1.2MM 2108-151-000

## (undated) DEVICE — SUTURE VICRYL+ 2-0 27IN CT-1 UND VCP259H

## (undated) DEVICE — SU MONOCRYL 3-0 PS-2 27" Y427H

## (undated) DEVICE — SUTURE MONOCRYL 3-0 18 PS2 UND MCP497G

## (undated) DEVICE — SU PDS II 2-0 CT-1 27" Z339H

## (undated) DEVICE — GLOVE BIOGEL INDICATOR 7.5 LF 41675

## (undated) DEVICE — CUSTOM PACK TOTAL KNEE ACCESSORY SOP5BTAHEA

## (undated) DEVICE — RETRACTOR RADIALUX 4 INTCHNG BLADE CRDLS 50-101-1

## (undated) DEVICE — CAST PADDING 6IN COTTON WEBRIL STERILE 2554

## (undated) DEVICE — ESU ELEC BLADE 2.75" COATED/INSULATED E1455

## (undated) DEVICE — GLOVE PROTEXIS MICRO 7.0  2D73PM70

## (undated) DEVICE — SOLUTION IRRIG 2B7127 .9NS 3000ML BAG

## (undated) DEVICE — DRSG TEGADERM 2 1/2X 2 3/4"

## (undated) DEVICE — DRAPE IOBAN INCISE 23X17" 6650EZ

## (undated) DEVICE — CUSTOM PACK TOTAL KNEE SOP5BTKHEC

## (undated) DEVICE — LINEN GOWN OVERSIZE 5408

## (undated) DEVICE — LINEN TOWEL PACK X5 5464

## (undated) DEVICE — GLOVE PROTEXIS W/NEU-THERA 7.0  2D73TE70

## (undated) DEVICE — PREP CHLORAPREP 26ML TINTED HI-LITE ORANGE 930815

## (undated) DEVICE — PAD CHUX UNDERPAD 23X24" 7136

## (undated) RX ORDER — FENTANYL CITRATE 50 UG/ML
INJECTION, SOLUTION INTRAMUSCULAR; INTRAVENOUS
Status: DISPENSED
Start: 2024-04-11

## (undated) RX ORDER — DEXMEDETOMIDINE HYDROCHLORIDE 4 UG/ML
INJECTION, SOLUTION INTRAVENOUS
Status: DISPENSED
Start: 2022-10-03

## (undated) RX ORDER — PROPOFOL 10 MG/ML
INJECTION, EMULSION INTRAVENOUS
Status: DISPENSED
Start: 2024-04-11

## (undated) RX ORDER — PROPOFOL 10 MG/ML
INJECTION, EMULSION INTRAVENOUS
Status: DISPENSED
Start: 2022-10-03

## (undated) RX ORDER — APREPITANT 40 MG/1
CAPSULE ORAL
Status: DISPENSED
Start: 2022-10-03

## (undated) RX ORDER — ACETAMINOPHEN 500 MG
TABLET ORAL
Status: DISPENSED
Start: 2022-10-03

## (undated) RX ORDER — FENTANYL CITRATE 50 UG/ML
INJECTION, SOLUTION INTRAMUSCULAR; INTRAVENOUS
Status: DISPENSED
Start: 2022-10-03

## (undated) RX ORDER — CEFAZOLIN SODIUM/WATER 2 G/20 ML
SYRINGE (ML) INTRAVENOUS
Status: DISPENSED
Start: 2022-10-03

## (undated) RX ORDER — DEXMEDETOMIDINE HYDROCHLORIDE 4 UG/ML
INJECTION, SOLUTION INTRAVENOUS
Status: DISPENSED
Start: 2024-04-11